# Patient Record
Sex: MALE | Race: WHITE | NOT HISPANIC OR LATINO
[De-identification: names, ages, dates, MRNs, and addresses within clinical notes are randomized per-mention and may not be internally consistent; named-entity substitution may affect disease eponyms.]

---

## 2017-01-04 PROBLEM — R03.0 BORDERLINE HYPERTENSION: Status: RESOLVED | Noted: 2017-01-04 | Resolved: 2017-01-04

## 2017-01-04 PROBLEM — Z87.891 FORMER SMOKER: Status: ACTIVE | Noted: 2017-01-04

## 2017-02-28 ENCOUNTER — APPOINTMENT (OUTPATIENT)
Dept: COLORECTAL SURGERY | Facility: CLINIC | Age: 42
End: 2017-02-28

## 2017-02-28 VITALS
RESPIRATION RATE: 16 BRPM | WEIGHT: 194 LBS | SYSTOLIC BLOOD PRESSURE: 118 MMHG | BODY MASS INDEX: 27.77 KG/M2 | DIASTOLIC BLOOD PRESSURE: 75 MMHG | HEIGHT: 70 IN | TEMPERATURE: 97.8 F | HEART RATE: 58 BPM

## 2017-03-21 ENCOUNTER — APPOINTMENT (OUTPATIENT)
Dept: COLORECTAL SURGERY | Facility: CLINIC | Age: 42
End: 2017-03-21

## 2017-03-21 VITALS
HEIGHT: 70 IN | SYSTOLIC BLOOD PRESSURE: 130 MMHG | RESPIRATION RATE: 14 BRPM | WEIGHT: 194 LBS | HEART RATE: 58 BPM | TEMPERATURE: 97.9 F | BODY MASS INDEX: 27.77 KG/M2 | DIASTOLIC BLOOD PRESSURE: 83 MMHG

## 2017-03-21 DIAGNOSIS — L29.0 PRURITUS ANI: ICD-10-CM

## 2019-03-11 ENCOUNTER — NON-APPOINTMENT (OUTPATIENT)
Age: 44
End: 2019-03-11

## 2019-03-11 ENCOUNTER — APPOINTMENT (OUTPATIENT)
Dept: FAMILY MEDICINE | Facility: CLINIC | Age: 44
End: 2019-03-11
Payer: COMMERCIAL

## 2019-03-11 VITALS
OXYGEN SATURATION: 98 % | DIASTOLIC BLOOD PRESSURE: 90 MMHG | SYSTOLIC BLOOD PRESSURE: 130 MMHG | BODY MASS INDEX: 30.3 KG/M2 | WEIGHT: 214 LBS | RESPIRATION RATE: 14 BRPM | HEART RATE: 53 BPM | TEMPERATURE: 98.1 F | HEIGHT: 70.5 IN

## 2019-03-11 PROCEDURE — 99386 PREV VISIT NEW AGE 40-64: CPT | Mod: 25

## 2019-03-11 PROCEDURE — 93000 ELECTROCARDIOGRAM COMPLETE: CPT

## 2019-03-11 NOTE — PHYSICAL EXAM

## 2019-03-11 NOTE — HISTORY OF PRESENT ILLNESS
[de-identified] : 43 y.o. M with PMHx of HTN here as new pt to establish care. Pt is feeling well, no complaints. Eats a plant-based, vegan diet. Exercises regularly. \par \par

## 2019-03-11 NOTE — PLAN
[FreeTextEntry1] : HCM: \par -check labs\par -EKG reviewed, sinus isha, unchanged \par \par HTN: \par -stable, renew lisinopril 10mg

## 2019-03-12 LAB
25(OH)D3 SERPL-MCNC: 37.1 NG/ML
ALBUMIN SERPL ELPH-MCNC: 4.4 G/DL
ALP BLD-CCNC: 39 U/L
ALT SERPL-CCNC: 29 U/L
ANION GAP SERPL CALC-SCNC: 8 MMOL/L
AST SERPL-CCNC: 32 U/L
BASOPHILS # BLD AUTO: 0.05 K/UL
BASOPHILS NFR BLD AUTO: 1 %
BILIRUB SERPL-MCNC: 0.8 MG/DL
BUN SERPL-MCNC: 10 MG/DL
CALCIUM SERPL-MCNC: 9.3 MG/DL
CHLORIDE SERPL-SCNC: 105 MMOL/L
CHOLEST SERPL-MCNC: 135 MG/DL
CHOLEST/HDLC SERPL: 3.9 RATIO
CO2 SERPL-SCNC: 29 MMOL/L
CREAT SERPL-MCNC: 0.91 MG/DL
EOSINOPHIL # BLD AUTO: 0.1 K/UL
EOSINOPHIL NFR BLD AUTO: 2.1 %
FOLATE SERPL-MCNC: 12.2 NG/ML
GLUCOSE SERPL-MCNC: 99 MG/DL
HBA1C MFR BLD HPLC: 5.4 %
HCT VFR BLD CALC: 44.3 %
HDLC SERPL-MCNC: 35 MG/DL
HGB BLD-MCNC: 14.3 G/DL
IMM GRANULOCYTES NFR BLD AUTO: 0.8 %
LDLC SERPL CALC-MCNC: 67 MG/DL
LYMPHOCYTES # BLD AUTO: 1.42 K/UL
LYMPHOCYTES NFR BLD AUTO: 29.6 %
MAN DIFF?: NORMAL
MCHC RBC-ENTMCNC: 30.9 PG
MCHC RBC-ENTMCNC: 32.3 GM/DL
MCV RBC AUTO: 95.7 FL
MONOCYTES # BLD AUTO: 0.6 K/UL
MONOCYTES NFR BLD AUTO: 12.5 %
NEUTROPHILS # BLD AUTO: 2.59 K/UL
NEUTROPHILS NFR BLD AUTO: 54 %
PLATELET # BLD AUTO: 189 K/UL
POTASSIUM SERPL-SCNC: 5.2 MMOL/L
PROT SERPL-MCNC: 6.9 G/DL
RBC # BLD: 4.63 M/UL
RBC # FLD: 11.9 %
SODIUM SERPL-SCNC: 142 MMOL/L
TRIGL SERPL-MCNC: 166 MG/DL
TSH SERPL-ACNC: 1.17 UIU/ML
VIT B12 SERPL-MCNC: 673 PG/ML
WBC # FLD AUTO: 4.8 K/UL

## 2019-04-24 ENCOUNTER — APPOINTMENT (OUTPATIENT)
Dept: INTERNAL MEDICINE | Facility: CLINIC | Age: 44
End: 2019-04-24

## 2019-06-10 ENCOUNTER — RX RENEWAL (OUTPATIENT)
Age: 44
End: 2019-06-10

## 2019-06-23 ENCOUNTER — RESULT CHARGE (OUTPATIENT)
Age: 44
End: 2019-06-23

## 2019-06-24 ENCOUNTER — APPOINTMENT (OUTPATIENT)
Dept: COLORECTAL SURGERY | Facility: CLINIC | Age: 44
End: 2019-06-24
Payer: COMMERCIAL

## 2019-06-24 ENCOUNTER — OTHER (OUTPATIENT)
Age: 44
End: 2019-06-24

## 2019-06-24 VITALS
BODY MASS INDEX: 30.3 KG/M2 | RESPIRATION RATE: 14 BRPM | WEIGHT: 214 LBS | TEMPERATURE: 98 F | DIASTOLIC BLOOD PRESSURE: 95 MMHG | HEART RATE: 59 BPM | SYSTOLIC BLOOD PRESSURE: 144 MMHG | HEIGHT: 70.5 IN

## 2019-06-24 DIAGNOSIS — K62.89 OTHER SPECIFIED DISEASES OF ANUS AND RECTUM: ICD-10-CM

## 2019-06-24 DIAGNOSIS — K61.0 ANAL ABSCESS: ICD-10-CM

## 2019-06-24 DIAGNOSIS — K60.2 ANAL FISSURE, UNSPECIFIED: ICD-10-CM

## 2019-06-24 PROCEDURE — 46050 I&D PERIANAL ABSCESS SUPFC: CPT

## 2019-06-24 PROCEDURE — 99214 OFFICE O/P EST MOD 30 MIN: CPT | Mod: 25

## 2019-06-24 NOTE — ASSESSMENT
[FreeTextEntry1] : Mr. 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 In office today, we proceeded to incise the superficial post anal space to allow for drainage. I have discussed with him the option of undergoing exam under anesthesia, fissurectomy, sphincterotomy and superficial fistulotomy to treat the underlying disease in the subsequent abscess/fistula that has developed. We discussed the recovery period and the methods for postprocedure care. He understands, is agreeable and will schedule in the near future.

## 2019-06-24 NOTE — HISTORY OF PRESENT ILLNESS
[FreeTextEntry1] : Mr. Grace presents to the office with reports of anorectal pain of one week's duration. He has also recently noted some purulent discharge. There is pain associated with defecation as well as itching. Overall, he reports regular bowel movements that are passed without straining. He denies fevers or chills.

## 2019-06-24 NOTE — PHYSICAL EXAM
[Posterior] : posteriorly [Skin Tags] : residual hemorrhoidal skin tags were noted [Alert] : alert [No Rash or Lesion] : No rash or lesion [Oriented to Person] : oriented to person [Calm] : calm [Oriented to Time] : oriented to time [Oriented to Place] : oriented to place [de-identified] : No apparent distress [de-identified] : normocephalic atraumatic [de-identified] : Posterior [de-identified] : moving all extremities x4

## 2019-06-27 ENCOUNTER — OUTPATIENT (OUTPATIENT)
Dept: OUTPATIENT SERVICES | Facility: HOSPITAL | Age: 44
LOS: 1 days | End: 2019-06-27
Payer: COMMERCIAL

## 2019-06-27 VITALS
RESPIRATION RATE: 15 BRPM | DIASTOLIC BLOOD PRESSURE: 83 MMHG | TEMPERATURE: 98 F | WEIGHT: 214.07 LBS | SYSTOLIC BLOOD PRESSURE: 125 MMHG | HEIGHT: 70 IN | HEART RATE: 67 BPM | OXYGEN SATURATION: 98 %

## 2019-06-27 DIAGNOSIS — Z98.890 OTHER SPECIFIED POSTPROCEDURAL STATES: Chronic | ICD-10-CM

## 2019-06-27 DIAGNOSIS — K62.5 HEMORRHAGE OF ANUS AND RECTUM: ICD-10-CM

## 2019-06-27 DIAGNOSIS — K60.2 ANAL FISSURE, UNSPECIFIED: ICD-10-CM

## 2019-06-27 DIAGNOSIS — Z01.818 ENCOUNTER FOR OTHER PREPROCEDURAL EXAMINATION: ICD-10-CM

## 2019-06-27 LAB
ALBUMIN SERPL ELPH-MCNC: 3.9 G/DL — SIGNIFICANT CHANGE UP (ref 3.3–5)
ALP SERPL-CCNC: 44 U/L — SIGNIFICANT CHANGE UP (ref 40–120)
ALT FLD-CCNC: 30 U/L — SIGNIFICANT CHANGE UP (ref 12–78)
ANION GAP SERPL CALC-SCNC: 5 MMOL/L — SIGNIFICANT CHANGE UP (ref 5–17)
AST SERPL-CCNC: 25 U/L — SIGNIFICANT CHANGE UP (ref 15–37)
BILIRUB SERPL-MCNC: 0.7 MG/DL — SIGNIFICANT CHANGE UP (ref 0.2–1.2)
BUN SERPL-MCNC: 7 MG/DL — SIGNIFICANT CHANGE UP (ref 7–23)
CALCIUM SERPL-MCNC: 8.6 MG/DL — SIGNIFICANT CHANGE UP (ref 8.5–10.1)
CHLORIDE SERPL-SCNC: 106 MMOL/L — SIGNIFICANT CHANGE UP (ref 96–108)
CO2 SERPL-SCNC: 29 MMOL/L — SIGNIFICANT CHANGE UP (ref 22–31)
CREAT SERPL-MCNC: 0.83 MG/DL — SIGNIFICANT CHANGE UP (ref 0.5–1.3)
GLUCOSE SERPL-MCNC: 99 MG/DL — SIGNIFICANT CHANGE UP (ref 70–99)
HCT VFR BLD CALC: 41.7 % — SIGNIFICANT CHANGE UP (ref 39–50)
HGB BLD-MCNC: 14.5 G/DL — SIGNIFICANT CHANGE UP (ref 13–17)
MCHC RBC-ENTMCNC: 31.5 PG — SIGNIFICANT CHANGE UP (ref 27–34)
MCHC RBC-ENTMCNC: 34.8 GM/DL — SIGNIFICANT CHANGE UP (ref 32–36)
MCV RBC AUTO: 90.7 FL — SIGNIFICANT CHANGE UP (ref 80–100)
NRBC # BLD: 0 /100 WBCS — SIGNIFICANT CHANGE UP (ref 0–0)
PLATELET # BLD AUTO: 225 K/UL — SIGNIFICANT CHANGE UP (ref 150–400)
POTASSIUM SERPL-MCNC: 3.6 MMOL/L — SIGNIFICANT CHANGE UP (ref 3.5–5.3)
POTASSIUM SERPL-SCNC: 3.6 MMOL/L — SIGNIFICANT CHANGE UP (ref 3.5–5.3)
PROT SERPL-MCNC: 7.5 G/DL — SIGNIFICANT CHANGE UP (ref 6–8.3)
RBC # BLD: 4.6 M/UL — SIGNIFICANT CHANGE UP (ref 4.2–5.8)
RBC # FLD: 11.9 % — SIGNIFICANT CHANGE UP (ref 10.3–14.5)
SODIUM SERPL-SCNC: 140 MMOL/L — SIGNIFICANT CHANGE UP (ref 135–145)
WBC # BLD: 6.51 K/UL — SIGNIFICANT CHANGE UP (ref 3.8–10.5)
WBC # FLD AUTO: 6.51 K/UL — SIGNIFICANT CHANGE UP (ref 3.8–10.5)

## 2019-06-27 PROCEDURE — 93010 ELECTROCARDIOGRAM REPORT: CPT | Mod: NC

## 2019-06-27 PROCEDURE — G0463: CPT

## 2019-06-27 PROCEDURE — 36415 COLL VENOUS BLD VENIPUNCTURE: CPT

## 2019-06-27 PROCEDURE — 85027 COMPLETE CBC AUTOMATED: CPT

## 2019-06-27 PROCEDURE — 80053 COMPREHEN METABOLIC PANEL: CPT

## 2019-06-27 PROCEDURE — 93005 ELECTROCARDIOGRAM TRACING: CPT

## 2019-06-27 NOTE — H&P PST ADULT - HISTORY OF PRESENT ILLNESS
44 yo M for fissurectomy  fistulectomy and all indicated procedures  c/o bloody BM's w rectal pain X  6 days

## 2019-06-27 NOTE — H&P PST ADULT - NSANTHOSAYNRD_GEN_A_CORE
No. MARÍA screening performed.  STOP BANG Legend: 0-2 = LOW Risk; 3-4 = INTERMEDIATE Risk; 5-8 = HIGH Risk

## 2019-07-01 PROBLEM — I10 ESSENTIAL (PRIMARY) HYPERTENSION: Chronic | Status: ACTIVE | Noted: 2019-06-27

## 2019-07-01 RX ORDER — SODIUM CHLORIDE 9 MG/ML
1000 INJECTION, SOLUTION INTRAVENOUS
Refills: 0 | Status: DISCONTINUED | OUTPATIENT
Start: 2019-07-03 | End: 2019-07-18

## 2019-07-02 ENCOUNTER — TRANSCRIPTION ENCOUNTER (OUTPATIENT)
Age: 44
End: 2019-07-02

## 2019-07-03 ENCOUNTER — APPOINTMENT (OUTPATIENT)
Dept: COLORECTAL SURGERY | Facility: HOSPITAL | Age: 44
End: 2019-07-03
Payer: COMMERCIAL

## 2019-07-03 ENCOUNTER — OUTPATIENT (OUTPATIENT)
Dept: OUTPATIENT SERVICES | Facility: HOSPITAL | Age: 44
LOS: 1 days | End: 2019-07-03
Payer: COMMERCIAL

## 2019-07-03 ENCOUNTER — RESULT REVIEW (OUTPATIENT)
Age: 44
End: 2019-07-03

## 2019-07-03 VITALS
HEIGHT: 70 IN | HEART RATE: 60 BPM | TEMPERATURE: 98 F | SYSTOLIC BLOOD PRESSURE: 143 MMHG | DIASTOLIC BLOOD PRESSURE: 92 MMHG | WEIGHT: 214.07 LBS | RESPIRATION RATE: 14 BRPM | OXYGEN SATURATION: 97 %

## 2019-07-03 VITALS
OXYGEN SATURATION: 96 % | DIASTOLIC BLOOD PRESSURE: 72 MMHG | HEART RATE: 58 BPM | RESPIRATION RATE: 14 BRPM | SYSTOLIC BLOOD PRESSURE: 128 MMHG | TEMPERATURE: 98 F

## 2019-07-03 DIAGNOSIS — Z98.890 OTHER SPECIFIED POSTPROCEDURAL STATES: Chronic | ICD-10-CM

## 2019-07-03 DIAGNOSIS — K60.2 ANAL FISSURE, UNSPECIFIED: ICD-10-CM

## 2019-07-03 PROCEDURE — 46045 I&D ABSC TRANAL UNDER ANES: CPT | Mod: 58

## 2019-07-03 PROCEDURE — 46200 REMOVAL OF ANAL FISSURE: CPT

## 2019-07-03 PROCEDURE — 88305 TISSUE EXAM BY PATHOLOGIST: CPT | Mod: 26

## 2019-07-03 PROCEDURE — 46270 REMOVE ANAL FIST SUBQ: CPT | Mod: 79

## 2019-07-03 PROCEDURE — 46270 REMOVE ANAL FIST SUBQ: CPT

## 2019-07-03 PROCEDURE — 88305 TISSUE EXAM BY PATHOLOGIST: CPT

## 2019-07-03 PROCEDURE — 46230 REMOVAL OF ANAL TAGS: CPT | Mod: 79

## 2019-07-03 RX ORDER — SODIUM CHLORIDE 9 MG/ML
1000 INJECTION, SOLUTION INTRAVENOUS
Refills: 0 | Status: DISCONTINUED | OUTPATIENT
Start: 2019-07-03 | End: 2019-07-03

## 2019-07-03 RX ORDER — OXYCODONE HYDROCHLORIDE 5 MG/1
5 TABLET ORAL ONCE
Refills: 0 | Status: DISCONTINUED | OUTPATIENT
Start: 2019-07-03 | End: 2019-07-03

## 2019-07-03 RX ORDER — ONDANSETRON 8 MG/1
4 TABLET, FILM COATED ORAL ONCE
Refills: 0 | Status: DISCONTINUED | OUTPATIENT
Start: 2019-07-03 | End: 2019-07-03

## 2019-07-03 RX ORDER — HYDROMORPHONE HYDROCHLORIDE 2 MG/ML
0.5 INJECTION INTRAMUSCULAR; INTRAVENOUS; SUBCUTANEOUS
Refills: 0 | Status: DISCONTINUED | OUTPATIENT
Start: 2019-07-03 | End: 2019-07-03

## 2019-07-03 RX ORDER — KETOROLAC TROMETHAMINE 30 MG/ML
1 SYRINGE (ML) INJECTION
Qty: 20 | Refills: 0
Start: 2019-07-03 | End: 2019-07-07

## 2019-07-03 RX ORDER — BUPIVACAINE 13.3 MG/ML
20 INJECTION, SUSPENSION, LIPOSOMAL INFILTRATION ONCE
Refills: 0 | Status: DISCONTINUED | OUTPATIENT
Start: 2019-07-03 | End: 2019-07-18

## 2019-07-03 RX ORDER — FEXOFENADINE HCL 30 MG
0 TABLET ORAL
Qty: 0 | Refills: 0 | DISCHARGE

## 2019-07-03 RX ORDER — ACETAMINOPHEN 500 MG
1000 TABLET ORAL ONCE
Refills: 0 | Status: DISCONTINUED | OUTPATIENT
Start: 2019-07-03 | End: 2019-07-03

## 2019-07-03 RX ORDER — CEFOTETAN DISODIUM 1 G
2 VIAL (EA) INJECTION ONCE
Refills: 0 | Status: DISCONTINUED | OUTPATIENT
Start: 2019-07-03 | End: 2019-07-18

## 2019-07-03 RX ORDER — LISINOPRIL 2.5 MG/1
1 TABLET ORAL
Qty: 0 | Refills: 0 | DISCHARGE

## 2019-07-03 RX ORDER — POLYETHYLENE GLYCOL 3350 17 G/17G
17 POWDER, FOR SOLUTION ORAL
Qty: 510 | Refills: 0
Start: 2019-07-03 | End: 2019-08-01

## 2019-07-03 RX ADMIN — SODIUM CHLORIDE 60 MILLILITER(S): 9 INJECTION, SOLUTION INTRAVENOUS at 06:51

## 2019-07-03 NOTE — BRIEF OPERATIVE NOTE - NSICDXBRIEFPOSTOP_GEN_ALL_CORE_FT
POST-OP DIAGNOSIS:  Residual hemorrhoidal skin tag 03-Jul-2019 08:31:13  Theresa Clarke  Anal fistula 03-Jul-2019 08:31:04  Theresa Clarke  Anal abscess 03-Jul-2019 08:30:59  Theresa Clarke

## 2019-07-03 NOTE — ASU DISCHARGE PLAN (ADULT/PEDIATRIC) - CARE PROVIDER_API CALL
Theresa Clarke)  ColonRectal Surgery; Surgery  [address] [place] [address]  Phone: [phone]  Fax: [phone]  Follow Up Time: 2 weeks

## 2019-07-03 NOTE — BRIEF OPERATIVE NOTE - NSICDXBRIEFPREOP_GEN_ALL_CORE_FT
PRE-OP DIAGNOSIS:  Residual hemorrhoidal skin tag 03-Jul-2019 08:30:51  Theresa Clarke  Anal fistula 03-Jul-2019 08:30:42  Theresa Clarke  Anal abscess 03-Jul-2019 08:30:36  Theresa Clarke

## 2019-07-03 NOTE — BRIEF OPERATIVE NOTE - OPERATION/FINDINGS
Posterior anal fissure with associated sentinel pile and skin tag, superficial post anal space abscess with fistula

## 2019-07-03 NOTE — BRIEF OPERATIVE NOTE - NSICDXBRIEFPROCEDURE_GEN_ALL_CORE_FT
PROCEDURES:  Exam under anesthesia, rectum, with fissurectomy 03-Jul-2019 08:32:05  Theresa Clarke  Excision, anal papilla or single skin tag 03-Jul-2019 08:31:52  Theresa Clarke  Incision and drainage of intramural anal abscess with submuscular fistulectomy 03-Jul-2019 08:31:37  Theresa Clarke

## 2019-07-17 ENCOUNTER — OTHER (OUTPATIENT)
Age: 44
End: 2019-07-17

## 2019-07-24 ENCOUNTER — APPOINTMENT (OUTPATIENT)
Dept: COLORECTAL SURGERY | Facility: CLINIC | Age: 44
End: 2019-07-24
Payer: COMMERCIAL

## 2019-07-24 VITALS
SYSTOLIC BLOOD PRESSURE: 156 MMHG | BODY MASS INDEX: 30.3 KG/M2 | HEIGHT: 70.5 IN | HEART RATE: 66 BPM | RESPIRATION RATE: 14 BRPM | TEMPERATURE: 98 F | DIASTOLIC BLOOD PRESSURE: 93 MMHG | WEIGHT: 214 LBS

## 2019-07-24 PROCEDURE — 17250 CHEM CAUT OF GRANLTJ TISSUE: CPT

## 2019-07-24 PROCEDURE — 99024 POSTOP FOLLOW-UP VISIT: CPT

## 2019-07-24 NOTE — REASON FOR VISIT
[FreeTextEntry1] : 7/3/19 Drainage of anal abscess, fistulotomy, fissurectomy, sphincterotomy, excision skin tag [Post Op: _________] : a [unfilled] post op visit

## 2019-07-24 NOTE — HISTORY OF PRESENT ILLNESS
[FreeTextEntry1] : Here for a postop visit.\par Doing quite well and reports no significant anorectal postoperative pain. He was able to stop using narcotic pain medications after 3 days. Within one week, he had resumed running short distances. Bowel movements are passed without issue. Wound drainage is persistent, but noted to be decreasing.

## 2019-07-24 NOTE — ASSESSMENT
[FreeTextEntry1] : Mr. Grace presents to the office for a postop visit. He is doing remarkably well, and has had no issues with postop pain or wound healing. He has essentially resumed his regular activities with the exception of swimming and biking. At this juncture, he can resume those activities and I have encouraged him to continue his current bowel regimen indefinitely. F/u prn.

## 2019-07-24 NOTE — PHYSICAL EXAM
[No Rash or Lesion] : No rash or lesion [Alert] : alert [Oriented to Person] : oriented to person [Oriented to Place] : oriented to place [Oriented to Time] : oriented to time [de-identified] : Posteriorly based perianal wound very clean and granulating well [Calm] : calm [de-identified] : no apparent distress [de-identified] : Normocephalic atraumatic [de-identified] : Moving all extremities x4

## 2019-08-15 ENCOUNTER — APPOINTMENT (OUTPATIENT)
Dept: COLORECTAL SURGERY | Facility: CLINIC | Age: 44
End: 2019-08-15
Payer: COMMERCIAL

## 2019-08-15 VITALS
BODY MASS INDEX: 30.3 KG/M2 | SYSTOLIC BLOOD PRESSURE: 135 MMHG | HEIGHT: 70.5 IN | HEART RATE: 53 BPM | DIASTOLIC BLOOD PRESSURE: 82 MMHG | WEIGHT: 214 LBS | RESPIRATION RATE: 14 BRPM | TEMPERATURE: 98 F

## 2019-08-15 DIAGNOSIS — Z09 ENCOUNTER FOR FOLLOW-UP EXAMINATION AFTER COMPLETED TREATMENT FOR CONDITIONS OTHER THAN MALIGNANT NEOPLASM: ICD-10-CM

## 2019-08-15 PROCEDURE — 99024 POSTOP FOLLOW-UP VISIT: CPT

## 2019-08-15 PROCEDURE — 17250 CHEM CAUT OF GRANLTJ TISSUE: CPT | Mod: 79

## 2019-08-15 NOTE — PHYSICAL EXAM
[No Rash or Lesion] : No rash or lesion [Alert] : alert [Oriented to Place] : oriented to place [Oriented to Person] : oriented to person [Oriented to Time] : oriented to time [Calm] : calm [de-identified] : Posteriorly based perianal wound very clean and granulating well [de-identified] : no apparent distress [de-identified] : Normocephalic atraumatic [de-identified] : Moving all extremities x4

## 2019-08-15 NOTE — HISTORY OF PRESENT ILLNESS
[FreeTextEntry1] : Here for a postop visit.\par Continues to note wound drainage and now also reports noticing blood on the toilet paper with wiping after BMs. \par He otherwise denies any pain or discomfort and BMs are passed without issue.

## 2019-08-15 NOTE — ASSESSMENT
[FreeTextEntry1] : Mr. Grace presents to the office for a postop visit. He is doing well and I have reassured him with regards to the discharge and that it will eventually resolve. The bleeding is likely from the posteriorly based granulation tissue and we have applied silver nitrate to the site to help address it. All questions and concerns reviewed.

## 2019-08-15 NOTE — REASON FOR VISIT
[Post Op: _________] : a [unfilled] post op visit [FreeTextEntry1] : 7/3/19 Drainage of anal abscess, fistulotomy, fissurectomy, sphincterotomy, excision skin tag

## 2019-09-09 ENCOUNTER — RX RENEWAL (OUTPATIENT)
Age: 44
End: 2019-09-09

## 2020-01-06 ENCOUNTER — RX RENEWAL (OUTPATIENT)
Age: 45
End: 2020-01-06

## 2020-03-30 ENCOUNTER — RX RENEWAL (OUTPATIENT)
Age: 45
End: 2020-03-30

## 2020-07-10 ENCOUNTER — NON-APPOINTMENT (OUTPATIENT)
Age: 45
End: 2020-07-10

## 2020-07-10 ENCOUNTER — APPOINTMENT (OUTPATIENT)
Dept: INTERNAL MEDICINE | Facility: CLINIC | Age: 45
End: 2020-07-10
Payer: COMMERCIAL

## 2020-07-10 VITALS
WEIGHT: 223 LBS | OXYGEN SATURATION: 98 % | HEIGHT: 70.5 IN | RESPIRATION RATE: 14 BRPM | DIASTOLIC BLOOD PRESSURE: 90 MMHG | BODY MASS INDEX: 31.57 KG/M2 | SYSTOLIC BLOOD PRESSURE: 130 MMHG | TEMPERATURE: 98.2 F | HEART RATE: 55 BPM

## 2020-07-10 VITALS — DIASTOLIC BLOOD PRESSURE: 82 MMHG | SYSTOLIC BLOOD PRESSURE: 122 MMHG

## 2020-07-10 PROCEDURE — 36415 COLL VENOUS BLD VENIPUNCTURE: CPT

## 2020-07-10 PROCEDURE — 99396 PREV VISIT EST AGE 40-64: CPT | Mod: 25

## 2020-07-10 PROCEDURE — 93000 ELECTROCARDIOGRAM COMPLETE: CPT

## 2020-07-10 NOTE — ASSESSMENT
[FreeTextEntry1] : HTN- good control Lisinopril 10mg daily\par Check labs and EKG today\par Colon next year.\par

## 2020-07-11 ENCOUNTER — TRANSCRIPTION ENCOUNTER (OUTPATIENT)
Age: 45
End: 2020-07-11

## 2020-07-13 LAB
25(OH)D3 SERPL-MCNC: 26.1 NG/ML
ALBUMIN SERPL ELPH-MCNC: 4.8 G/DL
ALP BLD-CCNC: 38 U/L
ALT SERPL-CCNC: 31 U/L
ANION GAP SERPL CALC-SCNC: 16 MMOL/L
APPEARANCE: CLEAR
AST SERPL-CCNC: 38 U/L
BACTERIA: NEGATIVE
BASOPHILS # BLD AUTO: 0.05 K/UL
BASOPHILS NFR BLD AUTO: 0.8 %
BILIRUB SERPL-MCNC: 1 MG/DL
BILIRUBIN URINE: NEGATIVE
BLOOD URINE: NEGATIVE
BUN SERPL-MCNC: 9 MG/DL
CALCIUM SERPL-MCNC: 9.6 MG/DL
CHLORIDE SERPL-SCNC: 100 MMOL/L
CHOLEST SERPL-MCNC: 159 MG/DL
CHOLEST/HDLC SERPL: 4.3 RATIO
CO2 SERPL-SCNC: 26 MMOL/L
COLOR: COLORLESS
CREAT SERPL-MCNC: 0.93 MG/DL
EOSINOPHIL # BLD AUTO: 0.09 K/UL
EOSINOPHIL NFR BLD AUTO: 1.4 %
ESTIMATED AVERAGE GLUCOSE: 108 MG/DL
FOLATE SERPL-MCNC: 13.1 NG/ML
GLUCOSE QUALITATIVE U: NEGATIVE
GLUCOSE SERPL-MCNC: 85 MG/DL
HBA1C MFR BLD HPLC: 5.4 %
HCT VFR BLD CALC: 46.8 %
HDLC SERPL-MCNC: 37 MG/DL
HGB BLD-MCNC: 15 G/DL
HYALINE CASTS: 0 /LPF
IMM GRANULOCYTES NFR BLD AUTO: 0.8 %
KETONES URINE: NEGATIVE
LDLC SERPL CALC-MCNC: 80 MG/DL
LEUKOCYTE ESTERASE URINE: NEGATIVE
LYMPHOCYTES # BLD AUTO: 1.61 K/UL
LYMPHOCYTES NFR BLD AUTO: 24.8 %
MAN DIFF?: NORMAL
MCHC RBC-ENTMCNC: 31.8 PG
MCHC RBC-ENTMCNC: 32.1 GM/DL
MCV RBC AUTO: 99.2 FL
MICROSCOPIC-UA: NORMAL
MONOCYTES # BLD AUTO: 0.76 K/UL
MONOCYTES NFR BLD AUTO: 11.7 %
NEUTROPHILS # BLD AUTO: 3.93 K/UL
NEUTROPHILS NFR BLD AUTO: 60.5 %
NITRITE URINE: NEGATIVE
PH URINE: 7
PLATELET # BLD AUTO: 196 K/UL
POTASSIUM SERPL-SCNC: 4.8 MMOL/L
PROT SERPL-MCNC: 7.2 G/DL
PROTEIN URINE: NEGATIVE
RBC # BLD: 4.72 M/UL
RBC # FLD: 11.9 %
RED BLOOD CELLS URINE: 0 /HPF
SARS-COV-2 IGG SERPL IA-ACNC: <0.1 INDEX
SARS-COV-2 IGG SERPL QL IA: NEGATIVE
SODIUM SERPL-SCNC: 141 MMOL/L
SPECIFIC GRAVITY URINE: 1.01
SQUAMOUS EPITHELIAL CELLS: 0 /HPF
TRIGL SERPL-MCNC: 207 MG/DL
TSH SERPL-ACNC: 1.1 UIU/ML
UROBILINOGEN URINE: NORMAL
VIT B12 SERPL-MCNC: 632 PG/ML
WBC # FLD AUTO: 6.49 K/UL
WHITE BLOOD CELLS URINE: 0 /HPF

## 2020-07-14 ENCOUNTER — TRANSCRIPTION ENCOUNTER (OUTPATIENT)
Age: 45
End: 2020-07-14

## 2020-07-14 LAB — HEMOCCULT STL QL IA: NEGATIVE

## 2020-07-31 ENCOUNTER — TRANSCRIPTION ENCOUNTER (OUTPATIENT)
Age: 45
End: 2020-07-31

## 2020-10-07 ENCOUNTER — RX RENEWAL (OUTPATIENT)
Age: 45
End: 2020-10-07

## 2020-12-10 ENCOUNTER — TRANSCRIPTION ENCOUNTER (OUTPATIENT)
Age: 45
End: 2020-12-10

## 2021-01-06 ENCOUNTER — RX RENEWAL (OUTPATIENT)
Age: 46
End: 2021-01-06

## 2021-01-06 ENCOUNTER — TRANSCRIPTION ENCOUNTER (OUTPATIENT)
Age: 46
End: 2021-01-06

## 2021-06-14 ENCOUNTER — RX RENEWAL (OUTPATIENT)
Age: 46
End: 2021-06-14

## 2021-07-01 ENCOUNTER — NON-APPOINTMENT (OUTPATIENT)
Age: 46
End: 2021-07-01

## 2021-07-01 ENCOUNTER — APPOINTMENT (OUTPATIENT)
Dept: INTERNAL MEDICINE | Facility: CLINIC | Age: 46
End: 2021-07-01
Payer: COMMERCIAL

## 2021-07-01 VITALS
DIASTOLIC BLOOD PRESSURE: 84 MMHG | RESPIRATION RATE: 14 BRPM | SYSTOLIC BLOOD PRESSURE: 132 MMHG | HEIGHT: 70.5 IN | TEMPERATURE: 98.3 F | OXYGEN SATURATION: 98 % | BODY MASS INDEX: 30.01 KG/M2 | WEIGHT: 212 LBS | HEART RATE: 68 BPM

## 2021-07-01 PROCEDURE — 93000 ELECTROCARDIOGRAM COMPLETE: CPT

## 2021-07-01 PROCEDURE — 99396 PREV VISIT EST AGE 40-64: CPT | Mod: 25

## 2021-07-01 PROCEDURE — 99072 ADDL SUPL MATRL&STAF TM PHE: CPT

## 2021-07-02 LAB
25(OH)D3 SERPL-MCNC: 46.2 NG/ML
ALBUMIN SERPL ELPH-MCNC: 4.7 G/DL
ALP BLD-CCNC: 39 U/L
ALT SERPL-CCNC: 24 U/L
ANION GAP SERPL CALC-SCNC: 10 MMOL/L
APPEARANCE: CLEAR
AST SERPL-CCNC: 32 U/L
BACTERIA: NEGATIVE
BASOPHILS # BLD AUTO: 0.04 K/UL
BASOPHILS NFR BLD AUTO: 0.7 %
BILIRUB SERPL-MCNC: 1.1 MG/DL
BILIRUBIN URINE: NEGATIVE
BLOOD URINE: NEGATIVE
BUN SERPL-MCNC: 9 MG/DL
CALCIUM SERPL-MCNC: 9.7 MG/DL
CHLORIDE SERPL-SCNC: 101 MMOL/L
CHOLEST SERPL-MCNC: 168 MG/DL
CO2 SERPL-SCNC: 26 MMOL/L
COLOR: YELLOW
CREAT SERPL-MCNC: 0.97 MG/DL
EOSINOPHIL # BLD AUTO: 0.05 K/UL
EOSINOPHIL NFR BLD AUTO: 0.9 %
ESTIMATED AVERAGE GLUCOSE: 105 MG/DL
FOLATE SERPL-MCNC: 18.7 NG/ML
GLUCOSE QUALITATIVE U: NEGATIVE
GLUCOSE SERPL-MCNC: 96 MG/DL
HBA1C MFR BLD HPLC: 5.3 %
HCT VFR BLD CALC: 43.7 %
HDLC SERPL-MCNC: 38 MG/DL
HGB BLD-MCNC: 14.7 G/DL
HYALINE CASTS: 0 /LPF
IMM GRANULOCYTES NFR BLD AUTO: 0.5 %
KETONES URINE: NEGATIVE
LDLC SERPL CALC-MCNC: 85 MG/DL
LEUKOCYTE ESTERASE URINE: NEGATIVE
LYMPHOCYTES # BLD AUTO: 1.55 K/UL
LYMPHOCYTES NFR BLD AUTO: 27.5 %
MAN DIFF?: NORMAL
MCHC RBC-ENTMCNC: 31.7 PG
MCHC RBC-ENTMCNC: 33.6 GM/DL
MCV RBC AUTO: 94.4 FL
MICROSCOPIC-UA: NORMAL
MONOCYTES # BLD AUTO: 0.64 K/UL
MONOCYTES NFR BLD AUTO: 11.3 %
NEUTROPHILS # BLD AUTO: 3.33 K/UL
NEUTROPHILS NFR BLD AUTO: 59.1 %
NITRITE URINE: NEGATIVE
NONHDLC SERPL-MCNC: 130 MG/DL
PH URINE: 7
PLATELET # BLD AUTO: 195 K/UL
POTASSIUM SERPL-SCNC: 4.8 MMOL/L
PROT SERPL-MCNC: 7.6 G/DL
PROTEIN URINE: NEGATIVE
PSA SERPL-MCNC: 0.55 NG/ML
RBC # BLD: 4.63 M/UL
RBC # FLD: 11.7 %
RED BLOOD CELLS URINE: 1 /HPF
SODIUM SERPL-SCNC: 138 MMOL/L
SPECIFIC GRAVITY URINE: 1.02
SQUAMOUS EPITHELIAL CELLS: 0 /HPF
TRIGL SERPL-MCNC: 223 MG/DL
TSH SERPL-ACNC: 1.37 UIU/ML
UROBILINOGEN URINE: NORMAL
VIT B12 SERPL-MCNC: 651 PG/ML
WBC # FLD AUTO: 5.64 K/UL
WHITE BLOOD CELLS URINE: 1 /HPF

## 2021-07-08 LAB — HEMOCCULT STL QL IA: NEGATIVE

## 2021-07-12 ENCOUNTER — APPOINTMENT (OUTPATIENT)
Dept: COLORECTAL SURGERY | Facility: CLINIC | Age: 46
End: 2021-07-12
Payer: COMMERCIAL

## 2021-07-12 VITALS
WEIGHT: 210 LBS | TEMPERATURE: 97.3 F | DIASTOLIC BLOOD PRESSURE: 83 MMHG | OXYGEN SATURATION: 75 % | HEIGHT: 70.5 IN | SYSTOLIC BLOOD PRESSURE: 145 MMHG | BODY MASS INDEX: 29.73 KG/M2

## 2021-07-12 DIAGNOSIS — Z12.11 ENCOUNTER FOR SCREENING FOR MALIGNANT NEOPLASM OF COLON: ICD-10-CM

## 2021-07-12 PROCEDURE — 99214 OFFICE O/P EST MOD 30 MIN: CPT

## 2021-07-12 PROCEDURE — 99072 ADDL SUPL MATRL&STAF TM PHE: CPT

## 2021-07-12 NOTE — HISTORY OF PRESENT ILLNESS
[FreeTextEntry1] : Here for a postop visit.\par Continues to note wound drainage and now also reports noticing blood on the toilet paper with wiping after BMs. \par He otherwise denies any pain or discomfort and BMs are passed without issue.\par \par 7/12/21 Mr. Grace returns to the office for discussion of screening colonoscopy as he recently turned 45.  He denies any abdominal pain, blood in his stool or recent change in bowel habits.  There is no family history of colon or rectal cancer.

## 2021-07-12 NOTE — PHYSICAL EXAM
[No Rash or Lesion] : No rash or lesion [Alert] : alert [Oriented to Person] : oriented to person [Oriented to Place] : oriented to place [Oriented to Time] : oriented to time [Calm] : calm [de-identified] : soft, NTND [de-identified] : Posteriorly based perianal wound very clean and granulating well [de-identified] : no apparent distress [de-identified] : Normocephalic atraumatic [de-identified] : Moving all extremities x4

## 2021-07-12 NOTE — ASSESSMENT
[FreeTextEntry1] : Mr. Grace presents to the office for a postop visit. He is doing well and I have reassured him with regards to the discharge and that it will eventually resolve. The bleeding is likely from the posteriorly based granulation tissue and we have applied silver nitrate to the site to help address it. All questions and concerns reviewed.\par \par 7/12/21 Mr. Grace returns to the office for discussion of a screening colonoscopy.\par The risks/benefits/alternatives for a colonoscopy were discussed. These include a less than 1% risk of bleeding should any polyps be biopsied and/or removed. There is also a less than 0.1% risk of perforation. The patient understands the need to adhere to a clear liquid diet the day prior to procedure as well as having to perform a bowel prep in order to allow for adequate visualization of the mucosal surfaces.  Followup colonoscopies will be scheduled based on the findings that are seen at the time of the procedure. Patient understands and is agreeable, and will proceed with consent and scheduling.\par

## 2021-07-20 ENCOUNTER — TRANSCRIPTION ENCOUNTER (OUTPATIENT)
Age: 46
End: 2021-07-20

## 2021-08-08 ENCOUNTER — TRANSCRIPTION ENCOUNTER (OUTPATIENT)
Age: 46
End: 2021-08-08

## 2021-08-09 ENCOUNTER — APPOINTMENT (OUTPATIENT)
Dept: COLORECTAL SURGERY | Facility: HOSPITAL | Age: 46
End: 2021-08-09

## 2021-08-09 ENCOUNTER — OUTPATIENT (OUTPATIENT)
Dept: OUTPATIENT SERVICES | Facility: HOSPITAL | Age: 46
LOS: 1 days | End: 2021-08-09
Payer: COMMERCIAL

## 2021-08-09 DIAGNOSIS — Z98.890 OTHER SPECIFIED POSTPROCEDURAL STATES: Chronic | ICD-10-CM

## 2021-08-09 DIAGNOSIS — Z12.11 ENCOUNTER FOR SCREENING FOR MALIGNANT NEOPLASM OF COLON: ICD-10-CM

## 2021-08-09 PROCEDURE — G0121: CPT

## 2021-08-09 PROCEDURE — G0121 COLON CA SCRN NOT HI RSK IND: CPT

## 2021-09-18 ENCOUNTER — RX RENEWAL (OUTPATIENT)
Age: 46
End: 2021-09-18

## 2021-12-02 ENCOUNTER — APPOINTMENT (OUTPATIENT)
Dept: INTERNAL MEDICINE | Facility: CLINIC | Age: 46
End: 2021-12-02
Payer: COMMERCIAL

## 2021-12-02 DIAGNOSIS — Z82.49 FAMILY HISTORY OF ISCHEMIC HEART DISEASE AND OTHER DISEASES OF THE CIRCULATORY SYSTEM: ICD-10-CM

## 2021-12-02 PROCEDURE — 99213 OFFICE O/P EST LOW 20 MIN: CPT | Mod: 95

## 2021-12-02 NOTE — HISTORY OF PRESENT ILLNESS
[de-identified] : Pt here today for follow up.\par Mom diag with aneurysm [Home] : at home, [unfilled] , at the time of the visit. [Medical Office: ([facility])___] : at the medical office located in  [Verbal consent obtained from patient] : the patient, [unfilled]

## 2021-12-08 ENCOUNTER — RESULT REVIEW (OUTPATIENT)
Age: 46
End: 2021-12-08

## 2021-12-16 ENCOUNTER — APPOINTMENT (OUTPATIENT)
Dept: MRI IMAGING | Facility: CLINIC | Age: 46
End: 2021-12-16
Payer: COMMERCIAL

## 2021-12-16 ENCOUNTER — OUTPATIENT (OUTPATIENT)
Dept: OUTPATIENT SERVICES | Facility: HOSPITAL | Age: 46
LOS: 1 days | End: 2021-12-16
Payer: COMMERCIAL

## 2021-12-16 DIAGNOSIS — Z98.890 OTHER SPECIFIED POSTPROCEDURAL STATES: Chronic | ICD-10-CM

## 2021-12-16 DIAGNOSIS — Z82.49 FAMILY HISTORY OF ISCHEMIC HEART DISEASE AND OTHER DISEASES OF THE CIRCULATORY SYSTEM: ICD-10-CM

## 2021-12-16 PROCEDURE — 70545 MR ANGIOGRAPHY HEAD W/DYE: CPT | Mod: 26,59

## 2021-12-16 PROCEDURE — 70546 MR ANGIOGRAPH HEAD W/O&W/DYE: CPT | Mod: 26

## 2021-12-16 PROCEDURE — 70545 MR ANGIOGRAPHY HEAD W/DYE: CPT

## 2021-12-16 PROCEDURE — A9585: CPT

## 2022-01-09 ENCOUNTER — TRANSCRIPTION ENCOUNTER (OUTPATIENT)
Age: 47
End: 2022-01-09

## 2022-01-09 LAB — SARS-COV-2 N GENE NPH QL NAA+PROBE: NOT DETECTED

## 2022-01-13 DIAGNOSIS — Z23 ENCOUNTER FOR IMMUNIZATION: ICD-10-CM

## 2022-01-17 ENCOUNTER — RX RENEWAL (OUTPATIENT)
Age: 47
End: 2022-01-17

## 2022-02-09 ENCOUNTER — TRANSCRIPTION ENCOUNTER (OUTPATIENT)
Age: 47
End: 2022-02-09

## 2022-02-17 ENCOUNTER — TRANSCRIPTION ENCOUNTER (OUTPATIENT)
Age: 47
End: 2022-02-17

## 2022-03-23 ENCOUNTER — TRANSCRIPTION ENCOUNTER (OUTPATIENT)
Age: 47
End: 2022-03-23

## 2022-04-07 DIAGNOSIS — Z11.59 ENCOUNTER FOR SCREENING FOR OTHER VIRAL DISEASES: ICD-10-CM

## 2022-04-08 ENCOUNTER — TRANSCRIPTION ENCOUNTER (OUTPATIENT)
Age: 47
End: 2022-04-08

## 2022-04-11 PROBLEM — Z11.59 SCREENING FOR VIRAL DISEASE: Status: ACTIVE | Noted: 2020-07-10

## 2022-04-16 ENCOUNTER — RX RENEWAL (OUTPATIENT)
Age: 47
End: 2022-04-16

## 2022-07-05 ENCOUNTER — APPOINTMENT (OUTPATIENT)
Dept: INTERNAL MEDICINE | Facility: CLINIC | Age: 47
End: 2022-07-05

## 2022-07-05 ENCOUNTER — NON-APPOINTMENT (OUTPATIENT)
Age: 47
End: 2022-07-05

## 2022-07-05 VITALS
WEIGHT: 223 LBS | RESPIRATION RATE: 14 BRPM | HEIGHT: 69 IN | BODY MASS INDEX: 33.03 KG/M2 | DIASTOLIC BLOOD PRESSURE: 82 MMHG | OXYGEN SATURATION: 98 % | TEMPERATURE: 97.6 F | SYSTOLIC BLOOD PRESSURE: 140 MMHG | HEART RATE: 69 BPM

## 2022-07-05 VITALS — SYSTOLIC BLOOD PRESSURE: 118 MMHG | DIASTOLIC BLOOD PRESSURE: 78 MMHG

## 2022-07-05 PROCEDURE — 93000 ELECTROCARDIOGRAM COMPLETE: CPT | Mod: 1L

## 2022-07-05 PROCEDURE — 99396 PREV VISIT EST AGE 40-64: CPT

## 2022-07-05 RX ORDER — METHYLPREDNISOLONE 4 MG/1
4 TABLET ORAL
Qty: 21 | Refills: 0 | Status: DISCONTINUED | COMMUNITY
Start: 2022-01-12

## 2022-07-05 RX ORDER — AMOXICILLIN 500 MG/1
500 CAPSULE ORAL
Qty: 21 | Refills: 0 | Status: DISCONTINUED | COMMUNITY
Start: 2022-06-24

## 2022-07-06 LAB
25(OH)D3 SERPL-MCNC: 42.1 NG/ML
ALBUMIN SERPL ELPH-MCNC: 4.6 G/DL
ALP BLD-CCNC: 37 U/L
ALT SERPL-CCNC: 27 U/L
ANION GAP SERPL CALC-SCNC: 12 MMOL/L
APPEARANCE: CLEAR
AST SERPL-CCNC: 33 U/L
BACTERIA: NEGATIVE
BASOPHILS # BLD AUTO: 0.05 K/UL
BASOPHILS NFR BLD AUTO: 0.8 %
BILIRUB SERPL-MCNC: 0.7 MG/DL
BILIRUBIN URINE: NEGATIVE
BLOOD URINE: NEGATIVE
BUN SERPL-MCNC: 10 MG/DL
CALCIUM SERPL-MCNC: 9.5 MG/DL
CHLORIDE SERPL-SCNC: 102 MMOL/L
CHOLEST SERPL-MCNC: 191 MG/DL
CO2 SERPL-SCNC: 26 MMOL/L
COLOR: COLORLESS
CREAT SERPL-MCNC: 0.95 MG/DL
EGFR: 100 ML/MIN/1.73M2
EOSINOPHIL # BLD AUTO: 0.06 K/UL
EOSINOPHIL NFR BLD AUTO: 1 %
ESTIMATED AVERAGE GLUCOSE: 114 MG/DL
FOLATE SERPL-MCNC: 17.9 NG/ML
GLUCOSE QUALITATIVE U: NEGATIVE
GLUCOSE SERPL-MCNC: 92 MG/DL
HBA1C MFR BLD HPLC: 5.6 %
HCT VFR BLD CALC: 45.5 %
HDLC SERPL-MCNC: 40 MG/DL
HGB BLD-MCNC: 15.5 G/DL
HYALINE CASTS: 0 /LPF
IMM GRANULOCYTES NFR BLD AUTO: 1.3 %
KETONES URINE: NEGATIVE
LDLC SERPL CALC-MCNC: 104 MG/DL
LEUKOCYTE ESTERASE URINE: NEGATIVE
LYMPHOCYTES # BLD AUTO: 1.48 K/UL
LYMPHOCYTES NFR BLD AUTO: 24.3 %
MAN DIFF?: NORMAL
MCHC RBC-ENTMCNC: 31.9 PG
MCHC RBC-ENTMCNC: 34.1 GM/DL
MCV RBC AUTO: 93.6 FL
MICROSCOPIC-UA: NORMAL
MONOCYTES # BLD AUTO: 0.67 K/UL
MONOCYTES NFR BLD AUTO: 11 %
NEUTROPHILS # BLD AUTO: 3.74 K/UL
NEUTROPHILS NFR BLD AUTO: 61.6 %
NITRITE URINE: NEGATIVE
NONHDLC SERPL-MCNC: 151 MG/DL
PH URINE: 7.5
PLATELET # BLD AUTO: 197 K/UL
POTASSIUM SERPL-SCNC: 4.6 MMOL/L
PROT SERPL-MCNC: 7.3 G/DL
PROTEIN URINE: NEGATIVE
PSA SERPL-MCNC: 0.52 NG/ML
RBC # BLD: 4.86 M/UL
RBC # FLD: 11.7 %
RED BLOOD CELLS URINE: 0 /HPF
SODIUM SERPL-SCNC: 140 MMOL/L
SPECIFIC GRAVITY URINE: 1.01
SQUAMOUS EPITHELIAL CELLS: 0 /HPF
TRIGL SERPL-MCNC: 236 MG/DL
TSH SERPL-ACNC: 1.34 UIU/ML
UROBILINOGEN URINE: NORMAL
VIT B12 SERPL-MCNC: 570 PG/ML
WBC # FLD AUTO: 6.08 K/UL
WHITE BLOOD CELLS URINE: 0 /HPF

## 2022-07-10 LAB — HEMOCCULT STL QL IA: NEGATIVE

## 2022-07-20 ENCOUNTER — RX RENEWAL (OUTPATIENT)
Age: 47
End: 2022-07-20

## 2022-08-26 ENCOUNTER — APPOINTMENT (OUTPATIENT)
Dept: ORTHOPEDIC SURGERY | Facility: CLINIC | Age: 47
End: 2022-08-26

## 2022-08-26 VITALS — HEIGHT: 70 IN | WEIGHT: 215 LBS | BODY MASS INDEX: 30.78 KG/M2

## 2022-08-26 DIAGNOSIS — M77.11 LATERAL EPICONDYLITIS, RIGHT ELBOW: ICD-10-CM

## 2022-08-26 PROCEDURE — 99213 OFFICE O/P EST LOW 20 MIN: CPT

## 2022-08-26 NOTE — HISTORY OF PRESENT ILLNESS
[de-identified] : 8/26/22: JABIER. PATIENT HAVING RT SHARP ELBOW PAIN FOR A FEW WEEKS NOW THAT IS INTERMITTENT. [FreeTextEntry1] : RT ELBOW

## 2022-08-26 NOTE — IMAGING
[de-identified] : RIGHT ELBOW\par 1. No swelling, no ecchymosis, no warmth, no fluctuance.\par 2. LATERAL EPICONDYLE TTP\par 3. Full elbow flexion, extension, supination, pronation\par 4. 5/5 strength in flexion, extension, supination, pronation\par 5. No VarUs or Valgus instability\par 6. Motor and sensory intact distally\par

## 2022-08-26 NOTE — ASSESSMENT
[FreeTextEntry1] : ATRAUMATIC RIGHT ELBOW PAIN X 3 WEEKS\par DISCUSSED CSI, DEFERRING FOR NOW\par ACTIVITY MODIFICATION, HEP GIVEN, VOLTAREN\par

## 2022-10-20 ENCOUNTER — RX RENEWAL (OUTPATIENT)
Age: 47
End: 2022-10-20

## 2023-01-03 ENCOUNTER — NON-APPOINTMENT (OUTPATIENT)
Age: 48
End: 2023-01-03

## 2023-01-04 ENCOUNTER — APPOINTMENT (OUTPATIENT)
Dept: INTERNAL MEDICINE | Facility: CLINIC | Age: 48
End: 2023-01-04
Payer: COMMERCIAL

## 2023-01-04 VITALS
HEIGHT: 70 IN | OXYGEN SATURATION: 96 % | HEART RATE: 74 BPM | WEIGHT: 220 LBS | BODY MASS INDEX: 31.5 KG/M2 | DIASTOLIC BLOOD PRESSURE: 78 MMHG | SYSTOLIC BLOOD PRESSURE: 128 MMHG | RESPIRATION RATE: 16 BRPM

## 2023-01-04 DIAGNOSIS — R68.89 OTHER GENERAL SYMPTOMS AND SIGNS: ICD-10-CM
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
## 2023-01-04 PROCEDURE — 99213 OFFICE O/P EST LOW 20 MIN: CPT

## 2023-01-04 NOTE — PHYSICAL EXAM
[Normal] : affect was normal and insight and judgment were intact [de-identified] : small 1 mm area of induration on left lower abdomen, no surrouding rash/erythema migrans noted

## 2023-01-04 NOTE — HISTORY OF PRESENT ILLNESS
[FreeTextEntry8] : Patient is a 47 year old male who presents today with complaint of a tick bite. He has been out in the woods over the weekend hiking. Noticed the tick two days ago and pulled it out. Did not keep the tick. Denies rash, fevers, chills. Feels well.

## 2023-01-04 NOTE — PLAN
[FreeTextEntry1] : Tick bite \par - No erythema migrans or symptoms noted \par - Start prophylactic one time dose of doxycycline 200 mg once \par - Check lyme titers in 4 weeks \par

## 2023-01-12 ENCOUNTER — NON-APPOINTMENT (OUTPATIENT)
Age: 48
End: 2023-01-12

## 2023-01-21 ENCOUNTER — RX RENEWAL (OUTPATIENT)
Age: 48
End: 2023-01-21

## 2023-01-22 ENCOUNTER — NON-APPOINTMENT (OUTPATIENT)
Age: 48
End: 2023-01-22

## 2023-03-14 ENCOUNTER — APPOINTMENT (OUTPATIENT)
Dept: ORTHOPEDIC SURGERY | Facility: CLINIC | Age: 48
End: 2023-03-14
Payer: COMMERCIAL

## 2023-03-14 VITALS — HEIGHT: 70 IN | WEIGHT: 220 LBS | BODY MASS INDEX: 31.5 KG/M2

## 2023-03-14 DIAGNOSIS — S73.102A UNSPECIFIED SPRAIN OF LEFT HIP, INITIAL ENCOUNTER: ICD-10-CM

## 2023-03-14 DIAGNOSIS — M51.36 OTHER INTERVERTEBRAL DISC DEGENERATION, LUMBAR REGION: ICD-10-CM

## 2023-03-14 PROCEDURE — 72100 X-RAY EXAM L-S SPINE 2/3 VWS: CPT

## 2023-03-14 PROCEDURE — 99213 OFFICE O/P EST LOW 20 MIN: CPT

## 2023-03-14 PROCEDURE — 72170 X-RAY EXAM OF PELVIS: CPT

## 2023-03-14 RX ORDER — DICLOFENAC SODIUM 75 MG/1
75 TABLET, DELAYED RELEASE ORAL
Qty: 60 | Refills: 0 | Status: ACTIVE | COMMUNITY
Start: 2023-03-14 | End: 1900-01-01

## 2023-03-14 NOTE — PHYSICAL EXAM
[5___] : adduction 5[unfilled]/5 [Disc space narrowing] : Disc space narrowing [Left] : left hip with pelvis [AP] : anteroposterior [Lateral] : lateral [There are no fractures, subluxations or dislocations. No significant abnormalities are seen] : There are no fractures, subluxations or dislocations. No significant abnormalities are seen [FreeTextEntry1] : advanced ddd L5/S1 [] : no groin tenderness

## 2023-03-14 NOTE — HISTORY OF PRESENT ILLNESS
[4] : 4 [0] : 0 [Dull/Aching] : dull/aching [Sharp] : sharp [Intermittent] : intermittent [Nothing helps with pain getting better] : Nothing helps with pain getting better [de-identified] : 3-14-23- He states he developed sharp pain lateral left hip while playing [employer]ibee last week. 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 Pt was playing ultimate [employer]. Pain started towards the end of the game. Pt denies N/T

## 2023-04-20 ENCOUNTER — RX RENEWAL (OUTPATIENT)
Age: 48
End: 2023-04-20

## 2023-05-19 ENCOUNTER — NON-APPOINTMENT (OUTPATIENT)
Age: 48
End: 2023-05-19

## 2023-05-22 ENCOUNTER — APPOINTMENT (OUTPATIENT)
Dept: ORTHOPEDIC SURGERY | Facility: CLINIC | Age: 48
End: 2023-05-22
Payer: COMMERCIAL

## 2023-05-22 VITALS — HEIGHT: 70 IN | BODY MASS INDEX: 31.5 KG/M2 | WEIGHT: 220 LBS

## 2023-05-22 PROCEDURE — 99214 OFFICE O/P EST MOD 30 MIN: CPT

## 2023-05-22 PROCEDURE — 72110 X-RAY EXAM L-2 SPINE 4/>VWS: CPT

## 2023-05-22 NOTE — ASSESSMENT
[FreeTextEntry1] : 47 M s/p L3-4 laminectomy with recurrent ow back pain and BLLE radiculopathy. Failed NSAIDS, PT, CHiro care over last 2 months\par MRI L spine w and without contrast\par MDP\par .FU after MRI

## 2023-05-22 NOTE — IMAGING
[de-identified] : Spine:\par Inspection/Palpation:\par No tenderness to palpation throughout Cervical/thoracic/lumbar spine.\par No bony stepoffs, No lesions.\par \par Gait:\par Non-antalgic, able to perform bilateral toe and heel rise.  Able to perform tandem gait.  \par \par Range of Motion:\par Lumbar Spine: Flexion to 90 degrees, Extension to 30 degrees, rotation 30 degrees bilaterally, lateral flexion to 30 degrees bilaterally.\par \par Neurologic:\par \par Bilateral Lower Extremities 5/5 Iliopsoas/Quadriceps/Hamstrings/ Tibialis Anterior/ Gastrocnemius. Extensor Hallucis Longus/ Flexor Hallucis Longus except \par \par \par Sensation intact to light touch L2-S1\par \par Patellar/ Achilles reflex within normal limits.\par \par \par Negative straight leg raise\par \par No pain with IR/ER/Flexion of HIps bilaterally \par \par X-ray Ap/Lateral/Flexion/Extension of lumbar spine were viewed and interpreted.  s/p L3-4 laminectomy.  \par no spondylolisthesis

## 2023-05-22 NOTE — HISTORY OF PRESENT ILLNESS
[Lower back] : lower back [3] : 3 [2] : 2 [de-identified] : 5/22/23: 46yo male presenting with lower back pain. Reports he was playing [employer] on 3/7/23, saw Dr. Best in March. Reports progressively worse pain since, radiating pain and N/T down bilateral legs. Has taken muscle relaxer with some relief. Went to  on 5/20, given cyclobenzaprine with some relief, no imaging done.   Prior to this episode had no radicular pain  after laminectomy.  No bowel or bladder symptoms. No fevers or chills. .   \par \par hx of laminectomy by Randolph Klein (S, 2009)\par \par  [] : no

## 2023-05-24 ENCOUNTER — APPOINTMENT (OUTPATIENT)
Dept: MRI IMAGING | Facility: CLINIC | Age: 48
End: 2023-05-24

## 2023-05-24 RX ORDER — DIAZEPAM 5 MG/1
5 TABLET ORAL
Qty: 2 | Refills: 0 | Status: ACTIVE | COMMUNITY
Start: 2023-05-24 | End: 1900-01-01

## 2023-05-29 ENCOUNTER — FORM ENCOUNTER (OUTPATIENT)
Age: 48
End: 2023-05-29

## 2023-05-30 ENCOUNTER — APPOINTMENT (OUTPATIENT)
Dept: MRI IMAGING | Facility: CLINIC | Age: 48
End: 2023-05-30
Payer: COMMERCIAL

## 2023-05-30 PROCEDURE — 72158 MRI LUMBAR SPINE W/O & W/DYE: CPT

## 2023-06-01 ENCOUNTER — APPOINTMENT (OUTPATIENT)
Dept: ORTHOPEDIC SURGERY | Facility: CLINIC | Age: 48
End: 2023-06-01
Payer: COMMERCIAL

## 2023-06-01 VITALS — WEIGHT: 220 LBS | BODY MASS INDEX: 31.5 KG/M2 | HEIGHT: 70 IN

## 2023-06-01 DIAGNOSIS — Z98.890 OTHER SPECIFIED POSTPROCEDURAL STATES: ICD-10-CM

## 2023-06-01 DIAGNOSIS — M48.061 SPINAL STENOSIS, LUMBAR REGION WITHOUT NEUROGENIC CLAUDICATION: ICD-10-CM

## 2023-06-01 PROCEDURE — 99213 OFFICE O/P EST LOW 20 MIN: CPT

## 2023-06-01 NOTE — IMAGING
[de-identified] : Spine:\par Inspection/Palpation:\par No tenderness to palpation throughout Cervical/thoracic/lumbar spine.\par No bony stepoffs, No lesions.\par \par Gait:\par Non-antalgic, able to perform bilateral toe and heel rise.  Able to perform tandem gait.  \par \par Range of Motion:\par Lumbar Spine: Flexion to 90 degrees, Extension to 30 degrees, rotation 30 degrees bilaterally, lateral flexion to 30 degrees bilaterally.\par \par Neurologic:\par \par Bilateral Lower Extremities 5/5 Iliopsoas/Quadriceps/Hamstrings/ Tibialis Anterior/ Gastrocnemius. Extensor Hallucis Longus/ Flexor Hallucis Longus except \par \par \par Sensation intact to light touch L2-S1\par \par Patellar/ Achilles reflex within normal limits.\par \par \par Negative straight leg raise\par \par No pain with IR/ER/Flexion of HIps bilaterally \par \par X-ray Ap/Lateral/Flexion/Extension of lumbar spine were viewed and interpreted.  s/p L3-4 laminectomy.  \par no spondylolisthesis \par \par MRI Lumbar spine reviewed and interpreted independently.  Agree with report as follows. \par Developmentally small spinal canal.\par Multilevel lumbar degenerative disc disease most pronounced at L5-S1.\par Right-sided disc herniation or less likely a discal cyst at L1-2 with significant compression of the thecal sac and \par right L2 nerve root.\par Moderate central stenosis at L2-3.\par Postoperative study following decompression of the spinal canal at L3-4 with a disc bulge but no stenosis or \par nerve root compression.\par Disc bulge with a small extruded fragment at L4-5, facet osteoarthrosis and buckling of the ligamentum flavum \par with significant central stenosis.\par Central disc herniation and a small extruded fragment at L5-S1 with moderate compression of the thecal sac and \par both S1 nerve roots.\par Modic type II endplate changes adjacent to the L5-S1 disc.

## 2023-06-01 NOTE — HISTORY OF PRESENT ILLNESS
[Lower back] : lower back [3] : 3 [2] : 2 [de-identified] : 6/1/23: MRI results . Pain still in low back radiating to legs. NO weakness or numbness tingling. \par \par 5/22/23: 48yo male presenting with lower back pain. Reports he was playing [employer] on 3/7/23, saw Dr. Best in March. Reports progressively worse pain since, radiating pain and N/T down bilateral legs. Has taken muscle relaxer with some relief. Went to  on 5/20, given cyclobenzaprine with some relief, no imaging done.   Prior to this episode had no radicular pain  after laminectomy.  No bowel or bladder symptoms. No fevers or chills. .   \par \par hx of laminectomy by Randolph Klein ([facility], 2009)\par \par  [] : no

## 2023-06-01 NOTE — ASSESSMENT
[FreeTextEntry1] : 47 M s/p L3-4 laminectomy with recurrent ow back pain and BLLE radiculopathy. Failed NSAIDS, PT, [facility]iro care MRI with spinal stenosis and HNP at multiple levels\par Discussed his treatment options at this time inlclue PT, [place] vs surgical decompression\par Will proceed with pain managemnt referral for [place]\par He will continue with HEP\par FU after trial of [place] \par \par Discussed if required surgery there are 2 approaches one involves revision recompression alone which may help to address part of his flat back or a fusion with goal of restoring his sagittal alignment in addition to a decompression.

## 2023-06-02 ENCOUNTER — APPOINTMENT (OUTPATIENT)
Dept: PAIN MANAGEMENT | Facility: CLINIC | Age: 48
End: 2023-06-02
Payer: COMMERCIAL

## 2023-06-02 VITALS — BODY MASS INDEX: 31.5 KG/M2 | WEIGHT: 220 LBS | HEIGHT: 70 IN

## 2023-06-02 PROCEDURE — 99244 OFF/OP CNSLTJ NEW/EST MOD 40: CPT

## 2023-06-02 NOTE — DISCUSSION/SUMMARY
[de-identified] : After discussing various treatment options with the patient including but not limited to oral medications, physical therapy, exercise modalities as well as interventional spinal injections, we have decided with the following plan:\par \par - Continue Home exercises, stretching, activity modification, physical therapy, and conservative care.\par - MRI report and/or images was reviewed and discussed with the patient.\par - Recommend L5-S1 Lumbar Epidural Steroid Injection under fluoroscopic guidance with image.\par - The risks, benefits and alternatives of the proposed procedure were explained in detail with the patient. The risks outlined include but are not limited to infection, bleeding, post-dural puncture headache, nerve injury, a temporary increase in pain, failure to resolve symptoms, allergic reaction, symptom recurrence, and possible elevation of blood sugar in diabetics. All questions were answered to patient's apparent satisfaction and he/she verbalized an understanding.\par - Patient is presenting with acute/sub-acute radicular pain with impairment in ADLs and functionality.  The pain has not responded to conservative care including NSAID therapy and/or physical therapy.  There is no bleeding tendency, unstable medical condition, or systemic infection.\par - Follow up in 1-2 weeks post injection for re-evaluation.\par

## 2023-06-02 NOTE — PHYSICAL EXAM
[de-identified] : Constitutional; Appears well, no apparent distress\par Ability to communicate: Normal \par Respiratory: non-labored breathing\par Skin: No rash noted\par Head: Normocephalic, atraumatic\par Neck: no visible thyroid enlargement\par Eyes: Extraocular movements intact\par Neurologic: Alert and oriented x3\par Psychiatric: normal mood, affect and behavior\par  [] : non-antalgic

## 2023-06-02 NOTE — HISTORY OF PRESENT ILLNESS
[Lower back] : lower back [Left Leg] : left leg [Right Leg] : right leg [8] : 8 [5] : 5 [Dull/Aching] : dull/aching [Sharp] : sharp [Shooting] : shooting [Stabbing] : stabbing [Intermittent] : intermittent [Household chores] : household chores [Rest] : rest [Meds] : meds [Ice] : ice [Standing] : standing [Walking] : walking [FreeTextEntry1] : Initial HPI 06/02/23:\par Pain started a few months ago and is on the BILATERAL lower back and radiates into the bilateral buttocks and down the bilateral thighs to the knees with tightness in the calves and some numbness and tingling in the feet. \par \par MRI Lumbar Spine 5/30/23 independently reviewed: L4-5 disc bulge with extruded fragment and significant central stenosis' L5-S1 central HNP and extruded fragment with b/l S1 impingement. \par Conservative Care: Has done PT in the past \par Pain Medications: completed MDP with no relief; was prescribed diclofenac but never took it; tried muscle relaxer which made him too drowsy. \par Past Injections: none\par Spine surgery: L3-4 laminectomy - Feb 2009 \par Blood thinners: none [] : no [FreeTextEntry7] : b/l legs  [de-identified] : l mri

## 2023-06-14 ENCOUNTER — APPOINTMENT (OUTPATIENT)
Dept: PAIN MANAGEMENT | Facility: CLINIC | Age: 48
End: 2023-06-14
Payer: COMMERCIAL

## 2023-06-14 PROCEDURE — 62323 NJX INTERLAMINAR LMBR/SAC: CPT

## 2023-06-14 NOTE — PROCEDURE
[FreeTextEntry3] : Date of Service: 06/14/2023 \par \par Account: [id number]\par \par Patient: GURJIT LUCIO \par \par YOB: 1975\par \par Age: 47 year\par \par \par Surgeon:                                                         Abdiel Burgess D.O.\par \par Pre-Operative Diagnosis:                             Lumbosacral radiculitis\par \par Post-Operative Diagnosis:                           Same\par \par Procedure:                                                      Interlaminar lumbar epidural steroid injection (L5-S1) under fluoroscopic guidance\par \par Anesthesia:                                                     Local with MAC\par \par \par This procedure was carried out using fluoroscopic guidance.  The risks and benefits of the procedure were discussed extensively with the patient.  The consent of the patient was obtained and the following procedure was performed.\par \par The patient was placed in the prone position.  The lumbar area was prepped and draped in a sterile fashion.  A timeout was performed with all essential staff present and the site and side were verified. Under AP view with slight cephalad-caudad angulation, the L5-S1 interspace was identified and marked.  Using sterile technique, the superficial skin was anesthetized with 1% Lidocaine without epinephrine.  A 20-gauge Tuohy needle was advanced into the epidural space under fluoroscopy using [id number] technique and using loss of resistance at the L5-S1 level.  After negative aspiration for heme or CSF, an epidurogram was obtained using 2-3 cc Omnipaque contrast injected under live fluoroscopy, confirming epidural placement of the needle.  \par \par Epidurogram showed no evidence of intrathecal or intravascular flow, and good evidence of bilateral epidural flow from L3-S2 levels.  After this, 4 cc of preservative free normal saline plus 12 mg of betamethasone were injected into the epidural space.\par \par The needle was subsequently removed.  Anesthesia personnel were present throughout the procedure.\par \par The patient tolerated the procedure well and was instructed to contact me immediately if there were any problems.\par \par Abdiel Burgess D.O.\par

## 2023-06-22 ENCOUNTER — APPOINTMENT (OUTPATIENT)
Dept: PAIN MANAGEMENT | Facility: CLINIC | Age: 48
End: 2023-06-22

## 2023-07-06 ENCOUNTER — APPOINTMENT (OUTPATIENT)
Dept: PAIN MANAGEMENT | Facility: CLINIC | Age: 48
End: 2023-07-06
Payer: COMMERCIAL

## 2023-07-06 ENCOUNTER — APPOINTMENT (OUTPATIENT)
Dept: PAIN MANAGEMENT | Facility: CLINIC | Age: 48
End: 2023-07-06

## 2023-07-06 VITALS — BODY MASS INDEX: 31.5 KG/M2 | HEIGHT: 70 IN | WEIGHT: 220 LBS

## 2023-07-06 DIAGNOSIS — M54.17 RADICULOPATHY, LUMBOSACRAL REGION: ICD-10-CM

## 2023-07-06 DIAGNOSIS — M54.16 RADICULOPATHY, LUMBAR REGION: ICD-10-CM

## 2023-07-06 PROCEDURE — 99214 OFFICE O/P EST MOD 30 MIN: CPT

## 2023-07-06 NOTE — HISTORY OF PRESENT ILLNESS
[Lower back] : lower back [Left Leg] : left leg [Right Leg] : right leg [8] : 8 [5] : 5 [Dull/Aching] : dull/aching [Sharp] : sharp [Shooting] : shooting [Stabbing] : stabbing [Intermittent] : intermittent [Household chores] : household chores [Rest] : rest [Meds] : meds [Ice] : ice [Standing] : standing [Walking] : walking [FreeTextEntry1] : 07/06/2023: s/p L5-S1 LESI on 06/14/23  with 50% relief and improvement of ADLs. Pain was great for a week and then started to return. Pain still radiates down the b/l posterior thighs but no longer to the calves and feet. Right side is worse than the left.  Has started PT. Will be going to [place] at the end of the month. \par \par Initial HPI 06/02/23:\par Pain started a few months ago and is on the BILATERAL lower back and radiates into the bilateral buttocks and down the bilateral thighs to the knees with tightness in the calves and some numbness and tingling in the feet. \par \par MRI Lumbar Spine 5/30/23 independently reviewed: L4-5 disc bulge with extruded fragment and significant central stenosis' L5-S1 central HNP and extruded fragment with b/l S1 impingement. \par Conservative Care: Has done PT in the past \par Pain Medications: completed MDP with no relief; was prescribed diclofenac but never took it; tried muscle relaxer which made him too drowsy. \par Past Injections: none\par Spine surgery: L3-4 laminectomy - Feb 2009 \par Blood thinners: none [] : no [FreeTextEntry7] : b/l legs  [de-identified] : l mri  [TWNoteComboBox1] : 50%

## 2023-07-06 NOTE — PHYSICAL EXAM
[de-identified] : Constitutional; Appears well, no apparent distress\par Ability to communicate: Normal \par Respiratory: non-labored breathing\par Skin: No rash noted\par Head: Normocephalic, atraumatic\par Neck: no visible thyroid enlargement\par Eyes: Extraocular movements intact\par Neurologic: Alert and oriented x3\par Psychiatric: normal mood, affect and behavior\par  [] : non-antalgic

## 2023-07-06 NOTE — DISCUSSION/SUMMARY
[de-identified] : After discussing various treatment options with the patient including but not limited to oral medications, physical therapy, exercise modalities as well as interventional spinal injections, we have decided with the following plan:\par \par - Continue Home exercises, stretching, activity modification, physical therapy, and conservative care.\par - MRI report and/or images was reviewed and discussed with the patient.\par - Recommend L4-5 Lumbar Epidural Steroid Injection under fluoroscopic guidance with image.\par - The risks, benefits and alternatives of the proposed procedure were explained in detail with the patient. The risks outlined include but are not limited to infection, bleeding, post-dural puncture headache, nerve injury, a temporary increase in pain, failure to resolve symptoms, allergic reaction, symptom recurrence, and possible elevation of blood sugar in diabetics. All questions were answered to patient's apparent satisfaction and he/she verbalized an understanding.\par - Patient is presenting with acute/sub-acute radicular pain with impairment in ADLs and functionality.  The pain has not responded to conservative care including NSAID therapy and/or physical therapy.  There is no bleeding tendency, unstable medical condition, or systemic infection.\par - Follow up in 1-2 weeks post injection for re-evaluation.\par

## 2023-07-17 ENCOUNTER — RX RENEWAL (OUTPATIENT)
Age: 48
End: 2023-07-17

## 2023-08-16 NOTE — REASON FOR VISIT
Patient: Latoya Riddle    Procedure: Procedure(s):  CYSTOSCOPY, WITH RETROGRADE PYELOGRAM AND URETERAL STENT REPLACEMENT       Anesthesia Type:  MAC    Note:  Disposition: Inpatient   Postop Pain Control: Uneventful            Sign Out: Well controlled pain   PONV: No   Neuro/Psych: Uneventful            Sign Out: Acceptable/Baseline neuro status   Airway/Respiratory: Uneventful            Sign Out: Acceptable/Baseline resp. status   CV/Hemodynamics: Uneventful            Sign Out: Acceptable CV status; No obvious hypovolemia; No obvious fluid overload   Other NRE: NONE   DID A NON-ROUTINE EVENT OCCUR? No           Last vitals:  Vitals Value Taken Time   /63 08/16/23 1529   Temp 36.5  C (97.7  F) 08/16/23 1529   Pulse 56 08/16/23 1529   Resp 16 08/16/23 1529   SpO2 94 % 08/16/23 1529       Electronically Signed By: Anjum Roy MD  August 16, 2023  4:05 PM   [Follow-Up: _____] : a [unfilled] follow-up visit

## 2023-08-17 ENCOUNTER — APPOINTMENT (OUTPATIENT)
Dept: PAIN MANAGEMENT | Facility: CLINIC | Age: 48
End: 2023-08-17

## 2023-09-07 ENCOUNTER — APPOINTMENT (OUTPATIENT)
Dept: PAIN MANAGEMENT | Facility: CLINIC | Age: 48
End: 2023-09-07

## 2023-10-11 LAB
ESTIMATED AVERAGE GLUCOSE: 114 MG/DL
HBA1C MFR BLD HPLC: 5.6 %

## 2023-10-12 ENCOUNTER — RX RENEWAL (OUTPATIENT)
Age: 48
End: 2023-10-12

## 2023-10-12 LAB
25(OH)D3 SERPL-MCNC: 33.1 NG/ML
ALBUMIN SERPL ELPH-MCNC: 4.5 G/DL
ALP BLD-CCNC: 37 U/L
ALT SERPL-CCNC: 27 U/L
ANION GAP SERPL CALC-SCNC: 10 MMOL/L
APPEARANCE: CLEAR
AST SERPL-CCNC: 30 U/L
BACTERIA: NEGATIVE /HPF
BILIRUB SERPL-MCNC: 0.6 MG/DL
BILIRUBIN URINE: NEGATIVE
BLOOD URINE: NEGATIVE
BUN SERPL-MCNC: 6 MG/DL
CALCIUM SERPL-MCNC: 9.3 MG/DL
CAST: 0 /LPF
CHLORIDE SERPL-SCNC: 104 MMOL/L
CHOLEST SERPL-MCNC: 177 MG/DL
CO2 SERPL-SCNC: 27 MMOL/L
COLOR: YELLOW
CREAT SERPL-MCNC: 0.87 MG/DL
EGFR: 106 ML/MIN/1.73M2
EPITHELIAL CELLS: 0 /HPF
FOLATE SERPL-MCNC: 15.1 NG/ML
GLUCOSE QUALITATIVE U: NEGATIVE MG/DL
GLUCOSE SERPL-MCNC: 96 MG/DL
HDLC SERPL-MCNC: 34 MG/DL
KETONES URINE: NEGATIVE MG/DL
LDLC SERPL CALC-MCNC: 97 MG/DL
LEUKOCYTE ESTERASE URINE: NEGATIVE
MICROSCOPIC-UA: NORMAL
NITRITE URINE: NEGATIVE
NONHDLC SERPL-MCNC: 142 MG/DL
PH URINE: 6
POTASSIUM SERPL-SCNC: 4.3 MMOL/L
PROT SERPL-MCNC: 6.9 G/DL
PROTEIN URINE: NEGATIVE MG/DL
PSA SERPL-MCNC: 0.45 NG/ML
RED BLOOD CELLS URINE: 1 /HPF
SODIUM SERPL-SCNC: 141 MMOL/L
SPECIFIC GRAVITY URINE: 1.02
TRIGL SERPL-MCNC: 265 MG/DL
TSH SERPL-ACNC: 1.37 UIU/ML
UROBILINOGEN URINE: 0.2 MG/DL
VIT B12 SERPL-MCNC: 685 PG/ML
WHITE BLOOD CELLS URINE: 0 /HPF

## 2023-10-17 ENCOUNTER — APPOINTMENT (OUTPATIENT)
Dept: INTERNAL MEDICINE | Facility: CLINIC | Age: 48
End: 2023-10-17
Payer: COMMERCIAL

## 2023-10-17 ENCOUNTER — NON-APPOINTMENT (OUTPATIENT)
Age: 48
End: 2023-10-17

## 2023-10-17 VITALS
DIASTOLIC BLOOD PRESSURE: 70 MMHG | OXYGEN SATURATION: 98 % | HEIGHT: 70 IN | BODY MASS INDEX: 31.5 KG/M2 | WEIGHT: 220 LBS | HEART RATE: 68 BPM | RESPIRATION RATE: 16 BRPM | TEMPERATURE: 98.3 F | SYSTOLIC BLOOD PRESSURE: 112 MMHG

## 2023-10-17 DIAGNOSIS — Z23 ENCOUNTER FOR IMMUNIZATION: ICD-10-CM

## 2023-10-17 PROCEDURE — G0008: CPT

## 2023-10-17 PROCEDURE — 93000 ELECTROCARDIOGRAM COMPLETE: CPT

## 2023-10-17 PROCEDURE — 90686 IIV4 VACC NO PRSV 0.5 ML IM: CPT

## 2023-10-17 PROCEDURE — 99396 PREV VISIT EST AGE 40-64: CPT | Mod: 25

## 2024-01-08 ENCOUNTER — RX RENEWAL (OUTPATIENT)
Age: 49
End: 2024-01-08

## 2024-01-31 ENCOUNTER — APPOINTMENT (OUTPATIENT)
Dept: COLORECTAL SURGERY | Facility: CLINIC | Age: 49
End: 2024-01-31
Payer: COMMERCIAL

## 2024-01-31 VITALS
WEIGHT: 204 LBS | HEIGHT: 70 IN | DIASTOLIC BLOOD PRESSURE: 81 MMHG | RESPIRATION RATE: 14 BRPM | OXYGEN SATURATION: 98 % | HEART RATE: 59 BPM | BODY MASS INDEX: 29.2 KG/M2 | TEMPERATURE: 97.6 F | SYSTOLIC BLOOD PRESSURE: 137 MMHG

## 2024-01-31 DIAGNOSIS — K64.8 OTHER HEMORRHOIDS: ICD-10-CM

## 2024-01-31 PROCEDURE — 99214 OFFICE O/P EST MOD 30 MIN: CPT | Mod: 25

## 2024-01-31 PROCEDURE — 46600 DIAGNOSTIC ANOSCOPY SPX: CPT

## 2024-01-31 RX ORDER — HYDROCORTISONE 25 MG/G
2.5 CREAM TOPICAL
Qty: 30 | Refills: 3 | Status: ACTIVE | COMMUNITY
Start: 2024-01-31 | End: 1900-01-01

## 2024-01-31 NOTE — ASSESSMENT
[FreeTextEntry1] : Mr. Grace presents to the office for a postop visit. He is doing well and I have reassured him with regards to the discharge and that it will eventually resolve. The bleeding is likely from the posteriorly based granulation tissue and we have applied silver nitrate to the site to help address it. All questions and concerns reviewed.  7/12/21 Mr. Grace returns to the office for discussion of a screening colonoscopy. The risks/benefits/alternatives for a colonoscopy were discussed. These include a less than 1% risk of bleeding should any polyps be biopsied and/or removed. There is also a less than 0.1% risk of perforation. The patient understands the need to adhere to a clear liquid diet the day prior to procedure as well as having to perform a bowel prep in order to allow for adequate visualization of the mucosal surfaces.  Followup colonoscopies will be scheduled based on the findings that are seen at the time of the procedure. Patient understands and is agreeable, and will proceed with consent and scheduling.  1/31/24 Mr. Grace returns to the office for follow-up.  He reports recent internal hemorrhoidal irritation over the course of 2 days that have since resolved.  KRYSTA and anoscopy in office today were otherwise unremarkable.  Suspect internal hemorrhoidal irritation from consistency/texture of stool.  Overall, reassured and will prescribe hemorrhoidal creams in the event that this recurs.  Patient understands, and is agreeable.

## 2024-01-31 NOTE — HISTORY OF PRESENT ILLNESS
[FreeTextEntry1] : Here for a postop visit. Continues to note wound drainage and now also reports noticing blood on the toilet paper with wiping after BMs.  He otherwise denies any pain or discomfort and BMs are passed without issue.  7/12/21 Mr. Grace returns to the office for discussion of screening colonoscopy as he recently turned 45.  He denies any abdominal pain, blood in his stool or recent change in bowel habits.  There is no family history of colon or rectal cancer.  1/31/24 Mr. Grace returns to the office for follow-up.  Since his last office visit, he has undergone screening colonoscopy in August 2021.  He reports that he recently had 2 days of hemorrhoidal irritation and discomfort that suddenly resolved.  During that time, he denies being constipated or straining to evacuate his stools.  Here for further evaluation.

## 2024-01-31 NOTE — PHYSICAL EXAM
[Normal rectal exam] : exam was normal [Reduce Spontaneously] : a spontaneously reducible (grade II) [Skin Tags] : there were no residual hemorrhoidal skin tags seen [Normal] : was normal [None] : there was no rectal mass  [Gross Blood] : no gross blood [No Rash or Lesion] : No rash or lesion [Alert] : alert [Oriented to Person] : oriented to person [Oriented to Place] : oriented to place [Oriented to Time] : oriented to time [Calm] : calm [de-identified] : soft, NTND [de-identified] : no apparent distress [de-identified] : Normocephalic atraumatic [de-identified] : Moving all extremities x4

## 2024-04-02 DIAGNOSIS — Z00.00 ENCOUNTER FOR GENERAL ADULT MEDICAL EXAMINATION W/OUT ABNORMAL FINDINGS: ICD-10-CM

## 2024-04-08 ENCOUNTER — RX RENEWAL (OUTPATIENT)
Age: 49
End: 2024-04-08

## 2024-04-08 RX ORDER — LISINOPRIL 10 MG/1
10 TABLET ORAL
Qty: 90 | Refills: 0 | Status: ACTIVE | COMMUNITY
Start: 2019-03-11 | End: 1900-01-01

## 2024-04-11 LAB
25(OH)D3 SERPL-MCNC: 53.1 NG/ML
ALBUMIN SERPL ELPH-MCNC: 4.6 G/DL
ALP BLD-CCNC: 38 U/L
ALT SERPL-CCNC: 22 U/L
ANION GAP SERPL CALC-SCNC: 12 MMOL/L
APPEARANCE: CLEAR
AST SERPL-CCNC: 26 U/L
BACTERIA: NEGATIVE /HPF
BASOPHILS # BLD AUTO: 0.05 K/UL
BASOPHILS NFR BLD AUTO: 0.9 %
BILIRUB SERPL-MCNC: 0.7 MG/DL
BILIRUBIN URINE: NEGATIVE
BLOOD URINE: NEGATIVE
BUN SERPL-MCNC: 8 MG/DL
CALCIUM SERPL-MCNC: 9.7 MG/DL
CAST: 0 /LPF
CHLORIDE SERPL-SCNC: 102 MMOL/L
CHOLEST SERPL-MCNC: 174 MG/DL
CO2 SERPL-SCNC: 28 MMOL/L
COLOR: YELLOW
CREAT SERPL-MCNC: 0.98 MG/DL
EGFR: 95 ML/MIN/1.73M2
EOSINOPHIL # BLD AUTO: 0.11 K/UL
EOSINOPHIL NFR BLD AUTO: 1.9 %
EPITHELIAL CELLS: 0 /HPF
ESTIMATED AVERAGE GLUCOSE: 108 MG/DL
FOLATE SERPL-MCNC: 8.6 NG/ML
GLUCOSE QUALITATIVE U: NEGATIVE MG/DL
GLUCOSE SERPL-MCNC: 97 MG/DL
HBA1C MFR BLD HPLC: 5.4 %
HCT VFR BLD CALC: 43.6 %
HDLC SERPL-MCNC: 36 MG/DL
HGB BLD-MCNC: 14.6 G/DL
IMM GRANULOCYTES NFR BLD AUTO: 0.5 %
KETONES URINE: NEGATIVE MG/DL
LDLC SERPL CALC-MCNC: 108 MG/DL
LEUKOCYTE ESTERASE URINE: ABNORMAL
LYMPHOCYTES # BLD AUTO: 1.94 K/UL
LYMPHOCYTES NFR BLD AUTO: 33.6 %
MAN DIFF?: NORMAL
MCHC RBC-ENTMCNC: 31.5 PG
MCHC RBC-ENTMCNC: 33.5 GM/DL
MCV RBC AUTO: 94.2 FL
MICROSCOPIC-UA: NORMAL
MONOCYTES # BLD AUTO: 0.67 K/UL
MONOCYTES NFR BLD AUTO: 11.6 %
NEUTROPHILS # BLD AUTO: 2.98 K/UL
NEUTROPHILS NFR BLD AUTO: 51.5 %
NITRITE URINE: NEGATIVE
NONHDLC SERPL-MCNC: 138 MG/DL
PH URINE: 6.5
PLATELET # BLD AUTO: 192 K/UL
POTASSIUM SERPL-SCNC: 4.7 MMOL/L
PROT SERPL-MCNC: 7 G/DL
PROTEIN URINE: NEGATIVE MG/DL
RBC # BLD: 4.63 M/UL
RBC # FLD: 12 %
RED BLOOD CELLS URINE: 1 /HPF
SODIUM SERPL-SCNC: 142 MMOL/L
SPECIFIC GRAVITY URINE: 1.02
TRIGL SERPL-MCNC: 169 MG/DL
TSH SERPL-ACNC: 1.9 UIU/ML
UROBILINOGEN URINE: 0.2 MG/DL
VIT B12 SERPL-MCNC: 763 PG/ML
WBC # FLD AUTO: 5.78 K/UL
WHITE BLOOD CELLS URINE: 0 /HPF

## 2024-04-11 RX ORDER — METHYLPREDNISOLONE 4 MG/1
4 TABLET ORAL
Qty: 1 | Refills: 0 | Status: DISCONTINUED | COMMUNITY
Start: 2023-05-22 | End: 2024-04-11

## 2024-04-11 RX ORDER — DOXYCYCLINE HYCLATE 100 MG/1
100 TABLET ORAL ONCE
Qty: 2 | Refills: 0 | Status: DISCONTINUED | COMMUNITY
Start: 2023-01-04 | End: 2024-04-11

## 2024-04-16 ENCOUNTER — APPOINTMENT (OUTPATIENT)
Dept: INTERNAL MEDICINE | Facility: CLINIC | Age: 49
End: 2024-04-16
Payer: COMMERCIAL

## 2024-04-16 VITALS
TEMPERATURE: 98.6 F | HEART RATE: 58 BPM | OXYGEN SATURATION: 98 % | HEIGHT: 70 IN | SYSTOLIC BLOOD PRESSURE: 124 MMHG | WEIGHT: 204 LBS | RESPIRATION RATE: 16 BRPM | DIASTOLIC BLOOD PRESSURE: 82 MMHG | BODY MASS INDEX: 29.2 KG/M2

## 2024-04-16 DIAGNOSIS — I10 ESSENTIAL (PRIMARY) HYPERTENSION: ICD-10-CM

## 2024-04-16 PROCEDURE — 99213 OFFICE O/P EST LOW 20 MIN: CPT

## 2024-07-06 ENCOUNTER — RX RENEWAL (OUTPATIENT)
Age: 49
End: 2024-07-06

## 2024-10-03 ENCOUNTER — RX RENEWAL (OUTPATIENT)
Age: 49
End: 2024-10-03

## 2024-10-08 DIAGNOSIS — Z00.00 ENCOUNTER FOR GENERAL ADULT MEDICAL EXAMINATION W/OUT ABNORMAL FINDINGS: ICD-10-CM

## 2024-10-16 LAB
25(OH)D3 SERPL-MCNC: 50.2 NG/ML
ALBUMIN SERPL ELPH-MCNC: 4.6 G/DL
ALP BLD-CCNC: 44 U/L
ALT SERPL-CCNC: 24 U/L
ANION GAP SERPL CALC-SCNC: 11 MMOL/L
AST SERPL-CCNC: 37 U/L
BASOPHILS # BLD AUTO: 0.05 K/UL
BASOPHILS NFR BLD AUTO: 0.9 %
BILIRUB SERPL-MCNC: 1.1 MG/DL
BUN SERPL-MCNC: 9 MG/DL
CALCIUM SERPL-MCNC: 9.5 MG/DL
CHLORIDE SERPL-SCNC: 101 MMOL/L
CHOLEST SERPL-MCNC: 188 MG/DL
CO2 SERPL-SCNC: 29 MMOL/L
CREAT SERPL-MCNC: 1.14 MG/DL
EGFR: 79 ML/MIN/1.73M2
EOSINOPHIL # BLD AUTO: 0.12 K/UL
EOSINOPHIL NFR BLD AUTO: 2.2 %
ESTIMATED AVERAGE GLUCOSE: 105 MG/DL
FOLATE SERPL-MCNC: 17.2 NG/ML
GLUCOSE SERPL-MCNC: 101 MG/DL
HBA1C MFR BLD HPLC: 5.3 %
HCT VFR BLD CALC: 44.7 %
HDLC SERPL-MCNC: 38 MG/DL
HGB BLD-MCNC: 15.2 G/DL
IMM GRANULOCYTES NFR BLD AUTO: 0.2 %
LDLC SERPL CALC-MCNC: 119 MG/DL
LYMPHOCYTES # BLD AUTO: 1.87 K/UL
LYMPHOCYTES NFR BLD AUTO: 33.6 %
MAN DIFF?: NORMAL
MCHC RBC-ENTMCNC: 31.7 PG
MCHC RBC-ENTMCNC: 34 GM/DL
MCV RBC AUTO: 93.3 FL
MONOCYTES # BLD AUTO: 0.77 K/UL
MONOCYTES NFR BLD AUTO: 13.8 %
NEUTROPHILS # BLD AUTO: 2.74 K/UL
NEUTROPHILS NFR BLD AUTO: 49.3 %
NONHDLC SERPL-MCNC: 151 MG/DL
PLATELET # BLD AUTO: 218 K/UL
POTASSIUM SERPL-SCNC: 4.4 MMOL/L
PROT SERPL-MCNC: 7.3 G/DL
RBC # BLD: 4.79 M/UL
RBC # FLD: 11.8 %
SODIUM SERPL-SCNC: 141 MMOL/L
TRIGL SERPL-MCNC: 176 MG/DL
TSH SERPL-ACNC: 2.17 UIU/ML
VIT B12 SERPL-MCNC: 791 PG/ML
WBC # FLD AUTO: 5.56 K/UL

## 2024-10-17 LAB
APPEARANCE: CLEAR
BACTERIA: NEGATIVE /HPF
BILIRUBIN URINE: NEGATIVE
BLOOD URINE: NEGATIVE
CAST: 0 /LPF
COLOR: YELLOW
EPITHELIAL CELLS: 1 /HPF
GLUCOSE QUALITATIVE U: NEGATIVE MG/DL
KETONES URINE: ABNORMAL MG/DL
LEUKOCYTE ESTERASE URINE: ABNORMAL
MICROSCOPIC-UA: NORMAL
NITRITE URINE: NEGATIVE
PH URINE: 7.5
PROTEIN URINE: NORMAL MG/DL
RED BLOOD CELLS URINE: 2 /HPF
SPECIFIC GRAVITY URINE: 1.03
UROBILINOGEN URINE: 1 MG/DL
WHITE BLOOD CELLS URINE: 2 /HPF

## 2024-10-22 ENCOUNTER — NON-APPOINTMENT (OUTPATIENT)
Age: 49
End: 2024-10-22

## 2024-10-22 ENCOUNTER — APPOINTMENT (OUTPATIENT)
Dept: INTERNAL MEDICINE | Facility: CLINIC | Age: 49
End: 2024-10-22
Payer: COMMERCIAL

## 2024-10-22 VITALS
SYSTOLIC BLOOD PRESSURE: 116 MMHG | RESPIRATION RATE: 14 BRPM | HEART RATE: 60 BPM | DIASTOLIC BLOOD PRESSURE: 76 MMHG | BODY MASS INDEX: 29.92 KG/M2 | WEIGHT: 209 LBS | OXYGEN SATURATION: 99 % | HEIGHT: 70 IN

## 2024-10-22 DIAGNOSIS — Z00.00 ENCOUNTER FOR GENERAL ADULT MEDICAL EXAMINATION W/OUT ABNORMAL FINDINGS: ICD-10-CM

## 2024-10-22 DIAGNOSIS — Z23 ENCOUNTER FOR IMMUNIZATION: ICD-10-CM

## 2024-10-22 DIAGNOSIS — E78.5 HYPERLIPIDEMIA, UNSPECIFIED: ICD-10-CM

## 2024-10-22 PROCEDURE — 99396 PREV VISIT EST AGE 40-64: CPT | Mod: 25

## 2024-10-22 PROCEDURE — 90656 IIV3 VACC NO PRSV 0.5 ML IM: CPT

## 2024-10-22 PROCEDURE — G0008: CPT

## 2024-10-30 ENCOUNTER — NON-APPOINTMENT (OUTPATIENT)
Age: 49
End: 2024-10-30

## 2024-11-01 DIAGNOSIS — E78.5 HYPERLIPIDEMIA, UNSPECIFIED: ICD-10-CM

## 2024-11-02 ENCOUNTER — NON-APPOINTMENT (OUTPATIENT)
Age: 49
End: 2024-11-02

## 2024-11-04 ENCOUNTER — APPOINTMENT (OUTPATIENT)
Dept: CARDIOLOGY | Facility: CLINIC | Age: 49
End: 2024-11-04

## 2024-11-04 ENCOUNTER — NON-APPOINTMENT (OUTPATIENT)
Age: 49
End: 2024-11-04

## 2024-11-04 ENCOUNTER — APPOINTMENT (OUTPATIENT)
Dept: CARDIOLOGY | Facility: CLINIC | Age: 49
End: 2024-11-04
Payer: COMMERCIAL

## 2024-11-04 VITALS — DIASTOLIC BLOOD PRESSURE: 70 MMHG | SYSTOLIC BLOOD PRESSURE: 124 MMHG

## 2024-11-04 VITALS
SYSTOLIC BLOOD PRESSURE: 145 MMHG | WEIGHT: 210 LBS | HEIGHT: 60 IN | OXYGEN SATURATION: 95 % | HEART RATE: 53 BPM | BODY MASS INDEX: 41.23 KG/M2 | DIASTOLIC BLOOD PRESSURE: 83 MMHG

## 2024-11-04 DIAGNOSIS — R06.09 OTHER FORMS OF DYSPNEA: ICD-10-CM

## 2024-11-04 DIAGNOSIS — E78.5 HYPERLIPIDEMIA, UNSPECIFIED: ICD-10-CM

## 2024-11-04 DIAGNOSIS — I25.10 ATHEROSCLEROTIC HEART DISEASE OF NATIVE CORONARY ARTERY W/OUT ANGINA PECTORIS: ICD-10-CM

## 2024-11-04 DIAGNOSIS — I10 ESSENTIAL (PRIMARY) HYPERTENSION: ICD-10-CM

## 2024-11-04 PROCEDURE — 99204 OFFICE O/P NEW MOD 45 MIN: CPT

## 2024-11-04 PROCEDURE — 93000 ELECTROCARDIOGRAM COMPLETE: CPT

## 2024-11-04 PROCEDURE — G2211 COMPLEX E/M VISIT ADD ON: CPT | Mod: NC

## 2024-11-04 RX ORDER — ROSUVASTATIN CALCIUM 10 MG/1
10 TABLET, FILM COATED ORAL
Qty: 90 | Refills: 3 | Status: ACTIVE | COMMUNITY
Start: 2024-11-01 | End: 1900-01-01

## 2024-11-12 ENCOUNTER — APPOINTMENT (OUTPATIENT)
Dept: CARDIOLOGY | Facility: CLINIC | Age: 49
End: 2024-11-12
Payer: COMMERCIAL

## 2024-11-12 PROCEDURE — 93306 TTE W/DOPPLER COMPLETE: CPT

## 2024-12-04 ENCOUNTER — APPOINTMENT (OUTPATIENT)
Dept: CT IMAGING | Facility: CLINIC | Age: 49
End: 2024-12-04
Payer: COMMERCIAL

## 2024-12-04 PROCEDURE — 75574 CT ANGIO HRT W/3D IMAGE: CPT

## 2024-12-09 ENCOUNTER — RESULT REVIEW (OUTPATIENT)
Age: 49
End: 2024-12-09

## 2024-12-09 PROCEDURE — 75580 N-INVAS EST C FFR SW ALY CTA: CPT

## 2025-01-02 ENCOUNTER — NON-APPOINTMENT (OUTPATIENT)
Age: 50
End: 2025-01-02

## 2025-01-02 ENCOUNTER — RX RENEWAL (OUTPATIENT)
Age: 50
End: 2025-01-02

## 2025-01-02 ENCOUNTER — APPOINTMENT (OUTPATIENT)
Dept: CARDIOLOGY | Facility: CLINIC | Age: 50
End: 2025-01-02
Payer: COMMERCIAL

## 2025-01-02 VITALS
BODY MASS INDEX: 42.01 KG/M2 | OXYGEN SATURATION: 99 % | HEIGHT: 60 IN | WEIGHT: 214 LBS | DIASTOLIC BLOOD PRESSURE: 91 MMHG | SYSTOLIC BLOOD PRESSURE: 163 MMHG | HEART RATE: 50 BPM

## 2025-01-02 DIAGNOSIS — I25.10 ATHEROSCLEROTIC HEART DISEASE OF NATIVE CORONARY ARTERY W/OUT ANGINA PECTORIS: ICD-10-CM

## 2025-01-02 DIAGNOSIS — R07.89 OTHER CHEST PAIN: ICD-10-CM

## 2025-01-02 DIAGNOSIS — E78.5 HYPERLIPIDEMIA, UNSPECIFIED: ICD-10-CM

## 2025-01-02 PROCEDURE — 93000 ELECTROCARDIOGRAM COMPLETE: CPT

## 2025-01-02 PROCEDURE — 99214 OFFICE O/P EST MOD 30 MIN: CPT

## 2025-01-02 PROCEDURE — G2211 COMPLEX E/M VISIT ADD ON: CPT | Mod: NC

## 2025-01-06 LAB — DEPRECATED D DIMER PPP IA-ACNC: <150 NG/ML DDU

## 2025-03-06 ENCOUNTER — NON-APPOINTMENT (OUTPATIENT)
Age: 50
End: 2025-03-06

## 2025-03-06 ENCOUNTER — APPOINTMENT (OUTPATIENT)
Dept: CARDIOLOGY | Facility: CLINIC | Age: 50
End: 2025-03-06
Payer: COMMERCIAL

## 2025-03-06 VITALS
HEIGHT: 60 IN | WEIGHT: 216 LBS | HEART RATE: 55 BPM | OXYGEN SATURATION: 98 % | DIASTOLIC BLOOD PRESSURE: 82 MMHG | SYSTOLIC BLOOD PRESSURE: 148 MMHG | BODY MASS INDEX: 42.41 KG/M2

## 2025-03-06 VITALS — DIASTOLIC BLOOD PRESSURE: 70 MMHG | SYSTOLIC BLOOD PRESSURE: 124 MMHG

## 2025-03-06 DIAGNOSIS — I10 ESSENTIAL (PRIMARY) HYPERTENSION: ICD-10-CM

## 2025-03-06 DIAGNOSIS — E78.5 HYPERLIPIDEMIA, UNSPECIFIED: ICD-10-CM

## 2025-03-06 DIAGNOSIS — I25.10 ATHEROSCLEROTIC HEART DISEASE OF NATIVE CORONARY ARTERY W/OUT ANGINA PECTORIS: ICD-10-CM

## 2025-03-06 PROCEDURE — 93000 ELECTROCARDIOGRAM COMPLETE: CPT

## 2025-03-06 PROCEDURE — 99214 OFFICE O/P EST MOD 30 MIN: CPT

## 2025-03-06 PROCEDURE — G2211 COMPLEX E/M VISIT ADD ON: CPT | Mod: NC

## 2025-04-09 ENCOUNTER — APPOINTMENT (OUTPATIENT)
Dept: CARDIOLOGY | Facility: CLINIC | Age: 50
End: 2025-04-09
Payer: COMMERCIAL

## 2025-04-09 PROCEDURE — 93880 EXTRACRANIAL BILAT STUDY: CPT

## 2025-04-17 ENCOUNTER — TRANSCRIPTION ENCOUNTER (OUTPATIENT)
Age: 50
End: 2025-04-17

## 2025-04-17 DIAGNOSIS — E04.1 NONTOXIC SINGLE THYROID NODULE: ICD-10-CM

## 2025-04-18 ENCOUNTER — TRANSCRIPTION ENCOUNTER (OUTPATIENT)
Age: 50
End: 2025-04-18

## 2025-04-18 ENCOUNTER — APPOINTMENT (OUTPATIENT)
Dept: ULTRASOUND IMAGING | Facility: CLINIC | Age: 50
End: 2025-04-18

## 2025-04-18 ENCOUNTER — OUTPATIENT (OUTPATIENT)
Dept: OUTPATIENT SERVICES | Facility: HOSPITAL | Age: 50
LOS: 1 days | End: 2025-04-18
Payer: COMMERCIAL

## 2025-04-18 DIAGNOSIS — Z98.890 OTHER SPECIFIED POSTPROCEDURAL STATES: Chronic | ICD-10-CM

## 2025-04-18 DIAGNOSIS — E04.1 NONTOXIC SINGLE THYROID NODULE: ICD-10-CM

## 2025-04-18 PROCEDURE — 76536 US EXAM OF HEAD AND NECK: CPT | Mod: 26

## 2025-04-18 PROCEDURE — 76536 US EXAM OF HEAD AND NECK: CPT

## 2025-04-22 ENCOUNTER — APPOINTMENT (OUTPATIENT)
Dept: INTERNAL MEDICINE | Facility: CLINIC | Age: 50
End: 2025-04-22

## 2025-04-23 ENCOUNTER — TRANSCRIPTION ENCOUNTER (OUTPATIENT)
Age: 50
End: 2025-04-23

## 2025-05-22 ENCOUNTER — APPOINTMENT (OUTPATIENT)
Dept: SURGERY | Facility: CLINIC | Age: 50
End: 2025-05-22
Payer: COMMERCIAL

## 2025-05-22 VITALS
BODY MASS INDEX: 41.79 KG/M2 | WEIGHT: 214 LBS | SYSTOLIC BLOOD PRESSURE: 131 MMHG | HEART RATE: 53 BPM | DIASTOLIC BLOOD PRESSURE: 78 MMHG | OXYGEN SATURATION: 98 %

## 2025-05-22 DIAGNOSIS — E04.1 NONTOXIC SINGLE THYROID NODULE: ICD-10-CM

## 2025-05-22 PROCEDURE — 99204 OFFICE O/P NEW MOD 45 MIN: CPT

## 2025-06-06 ENCOUNTER — NON-APPOINTMENT (OUTPATIENT)
Age: 50
End: 2025-06-06

## 2025-07-01 ENCOUNTER — TRANSCRIPTION ENCOUNTER (OUTPATIENT)
Age: 50
End: 2025-07-01

## 2025-07-02 LAB
25(OH)D3 SERPL-MCNC: 39.1 NG/ML
ALBUMIN SERPL ELPH-MCNC: 4.5 G/DL
ALP BLD-CCNC: 33 U/L
ALT SERPL-CCNC: 39 U/L
ANION GAP SERPL CALC-SCNC: 14 MMOL/L
AST SERPL-CCNC: 51 U/L
BILIRUB SERPL-MCNC: 0.8 MG/DL
BUN SERPL-MCNC: 11 MG/DL
CALCIUM SERPL-MCNC: 9.3 MG/DL
CHLORIDE SERPL-SCNC: 102 MMOL/L
CHOLEST SERPL-MCNC: 137 MG/DL
CO2 SERPL-SCNC: 26 MMOL/L
CREAT SERPL-MCNC: 1.06 MG/DL
EGFRCR SERPLBLD CKD-EPI 2021: 86 ML/MIN/1.73M2
ESTIMATED AVERAGE GLUCOSE: 114 MG/DL
FOLATE SERPL-MCNC: 11.1 NG/ML
GLUCOSE SERPL-MCNC: 85 MG/DL
HBA1C MFR BLD HPLC: 5.6 %
HCT VFR BLD CALC: 40.3 %
HDLC SERPL-MCNC: 37 MG/DL
HGB BLD-MCNC: 13.5 G/DL
LDLC SERPL-MCNC: 81 MG/DL
MCHC RBC-ENTMCNC: 31.8 PG
MCHC RBC-ENTMCNC: 33.5 G/DL
MCV RBC AUTO: 94.8 FL
NONHDLC SERPL-MCNC: 101 MG/DL
PLATELET # BLD AUTO: 204 K/UL
POTASSIUM SERPL-SCNC: 4.2 MMOL/L
PROT SERPL-MCNC: 7 G/DL
PSA SERPL-MCNC: 0.53 NG/ML
RBC # BLD: 4.25 M/UL
RBC # FLD: 12.1 %
SODIUM SERPL-SCNC: 142 MMOL/L
TRIGL SERPL-MCNC: 102 MG/DL
TSH SERPL-ACNC: 1.39 UIU/ML
VIT B12 SERPL-MCNC: 818 PG/ML
WBC # FLD AUTO: 4.65 K/UL

## 2025-07-03 LAB
APPEARANCE: CLEAR
BACTERIA: NEGATIVE /HPF
BILIRUBIN URINE: NEGATIVE
BLOOD URINE: NEGATIVE
CAST: 0 /LPF
COLOR: YELLOW
EPITHELIAL CELLS: 0 /HPF
GLUCOSE QUALITATIVE U: NEGATIVE MG/DL
KETONES URINE: NEGATIVE MG/DL
LEUKOCYTE ESTERASE URINE: NEGATIVE
MICROSCOPIC-UA: NORMAL
NITRITE URINE: NEGATIVE
PH URINE: 8
PROTEIN URINE: NEGATIVE MG/DL
RED BLOOD CELLS URINE: 1 /HPF
SPECIFIC GRAVITY URINE: 1.02
UROBILINOGEN URINE: 0.2 MG/DL
WHITE BLOOD CELLS URINE: 0 /HPF

## 2025-07-08 ENCOUNTER — APPOINTMENT (OUTPATIENT)
Dept: INTERNAL MEDICINE | Facility: CLINIC | Age: 50
End: 2025-07-08
Payer: COMMERCIAL

## 2025-07-08 VITALS
WEIGHT: 216 LBS | BODY MASS INDEX: 30.92 KG/M2 | DIASTOLIC BLOOD PRESSURE: 78 MMHG | TEMPERATURE: 98.4 F | HEART RATE: 52 BPM | RESPIRATION RATE: 16 BRPM | HEIGHT: 70 IN | SYSTOLIC BLOOD PRESSURE: 112 MMHG | OXYGEN SATURATION: 100 %

## 2025-07-08 PROBLEM — R05.9 COUGH: Status: ACTIVE | Noted: 2025-07-08

## 2025-07-08 PROBLEM — R35.0 URINARY FREQUENCY: Status: ACTIVE | Noted: 2025-07-08

## 2025-07-08 PROCEDURE — G2211 COMPLEX E/M VISIT ADD ON: CPT | Mod: NC

## 2025-07-08 PROCEDURE — 99214 OFFICE O/P EST MOD 30 MIN: CPT

## 2025-07-08 RX ORDER — FLUTICASONE PROPIONATE 50 UG/1
50 SPRAY NASAL DAILY
Qty: 1 | Refills: 3 | Status: ACTIVE | COMMUNITY
Start: 2025-07-08 | End: 1900-01-01

## 2025-07-08 NOTE — H&P PST ADULT - BREASTS
EKG Interpretation #1    Interpreted by emergency department physician  Time performed: 2126  Time read: 2134    Rhythm: Sinus  Ventricular Rate: 108  QRS Axis: 69  Ectopy: None  Conduction: Sinus tachycardia with early repolarization abnormalities  ST Segments: Consistent with early repolarization abnormalities  T Waves: Consistent with early repolarization abnormalities  Q Waves: None noted    Other findings: Motion artifact but EKG is readable    Compared to EKG on: 1/11/2025 and appears unchanged    Clinical Impression: Sinus tachycardia with early repolarization abnormalities.  There is motion artifact but EKG is readable.  This is compared to an EKG on 1/11/2025 and appears unchanged.    DO Amelia WRIGHT Charles K, DO  07/07/25 6221     No masses; no nipple discharge

## 2025-07-15 ENCOUNTER — TRANSCRIPTION ENCOUNTER (OUTPATIENT)
Age: 50
End: 2025-07-15

## 2025-07-15 PROBLEM — R74.8 ELEVATED LIVER ENZYMES: Status: ACTIVE | Noted: 2025-07-15

## 2025-08-21 ENCOUNTER — APPOINTMENT (OUTPATIENT)
Dept: UROLOGY | Facility: CLINIC | Age: 50
End: 2025-08-21
Payer: COMMERCIAL

## 2025-08-21 VITALS
OXYGEN SATURATION: 97 % | SYSTOLIC BLOOD PRESSURE: 131 MMHG | DIASTOLIC BLOOD PRESSURE: 85 MMHG | BODY MASS INDEX: 30.78 KG/M2 | HEART RATE: 66 BPM | HEIGHT: 70 IN | WEIGHT: 215 LBS

## 2025-08-21 DIAGNOSIS — R39.9 UNSPECIFIED SYMPTOMS AND SIGNS INVOLVING THE GENITOURINARY SYSTEM: ICD-10-CM

## 2025-08-21 DIAGNOSIS — N40.1 BENIGN PROSTATIC HYPERPLASIA WITH LOWER URINARY TRACT SYMPMS: ICD-10-CM

## 2025-08-21 DIAGNOSIS — Z78.9 OTHER SPECIFIED HEALTH STATUS: ICD-10-CM

## 2025-08-21 DIAGNOSIS — N13.8 BENIGN PROSTATIC HYPERPLASIA WITH LOWER URINARY TRACT SYMPMS: ICD-10-CM

## 2025-08-21 DIAGNOSIS — N52.9 MALE ERECTILE DYSFUNCTION, UNSPECIFIED: ICD-10-CM

## 2025-08-21 DIAGNOSIS — Z12.5 ENCOUNTER FOR SCREENING FOR MALIGNANT NEOPLASM OF PROSTATE: ICD-10-CM

## 2025-08-21 PROCEDURE — 99204 OFFICE O/P NEW MOD 45 MIN: CPT | Mod: 25

## 2025-08-21 PROCEDURE — 51741 ELECTRO-UROFLOWMETRY FIRST: CPT

## 2025-08-21 PROCEDURE — 51798 US URINE CAPACITY MEASURE: CPT

## 2025-08-21 RX ORDER — FEXOFENADINE HCL 60 MG
TABLET ORAL
Refills: 0 | Status: ACTIVE | COMMUNITY

## 2025-08-21 RX ORDER — TADALAFIL 5 MG/1
5 TABLET ORAL
Qty: 90 | Refills: 1 | Status: ACTIVE | COMMUNITY
Start: 2025-08-21 | End: 1900-01-01

## 2025-08-21 RX ORDER — ROSUVASTATIN CALCIUM 5 MG/1
TABLET, FILM COATED ORAL
Refills: 0 | Status: ACTIVE | COMMUNITY

## 2025-08-22 LAB
APPEARANCE: CLEAR
BACTERIA: NEGATIVE /HPF
BILIRUBIN URINE: NEGATIVE
BLOOD URINE: NEGATIVE
CAST: 0 /LPF
COLOR: YELLOW
EPITHELIAL CELLS: 0 /HPF
GLUCOSE QUALITATIVE U: NEGATIVE MG/DL
KETONES URINE: NEGATIVE MG/DL
LEUKOCYTE ESTERASE URINE: NEGATIVE
MICROSCOPIC-UA: NORMAL
NITRITE URINE: NEGATIVE
PH URINE: 8
PROTEIN URINE: NEGATIVE MG/DL
RED BLOOD CELLS URINE: 1 /HPF
SPECIFIC GRAVITY URINE: 1.01
URINE CYTOLOGY: NORMAL
UROBILINOGEN URINE: 0.2 MG/DL
WHITE BLOOD CELLS URINE: 0 /HPF

## 2025-08-23 LAB — BACTERIA UR CULT: NORMAL

## 2025-08-26 ENCOUNTER — TRANSCRIPTION ENCOUNTER (OUTPATIENT)
Age: 50
End: 2025-08-26

## 2025-09-09 ENCOUNTER — APPOINTMENT (OUTPATIENT)
Dept: CARDIOLOGY | Facility: CLINIC | Age: 50
End: 2025-09-09
Payer: COMMERCIAL

## 2025-09-09 VITALS
OXYGEN SATURATION: 94 % | BODY MASS INDEX: 30.26 KG/M2 | DIASTOLIC BLOOD PRESSURE: 82 MMHG | HEART RATE: 58 BPM | SYSTOLIC BLOOD PRESSURE: 132 MMHG | HEIGHT: 70 IN | WEIGHT: 211.38 LBS

## 2025-09-09 DIAGNOSIS — E78.5 HYPERLIPIDEMIA, UNSPECIFIED: ICD-10-CM

## 2025-09-09 DIAGNOSIS — I10 ESSENTIAL (PRIMARY) HYPERTENSION: ICD-10-CM

## 2025-09-09 DIAGNOSIS — I25.10 ATHEROSCLEROTIC HEART DISEASE OF NATIVE CORONARY ARTERY W/OUT ANGINA PECTORIS: ICD-10-CM

## 2025-09-09 PROCEDURE — 99214 OFFICE O/P EST MOD 30 MIN: CPT

## 2025-09-09 PROCEDURE — G2211 COMPLEX E/M VISIT ADD ON: CPT | Mod: NC

## 2025-09-09 PROCEDURE — 93000 ELECTROCARDIOGRAM COMPLETE: CPT
